# Patient Record
Sex: MALE | Race: ASIAN | NOT HISPANIC OR LATINO | Employment: OTHER | URBAN - METROPOLITAN AREA
[De-identification: names, ages, dates, MRNs, and addresses within clinical notes are randomized per-mention and may not be internally consistent; named-entity substitution may affect disease eponyms.]

---

## 2021-06-16 PROBLEM — K52.9 GASTROENTERITIS: Status: ACTIVE | Noted: 2018-01-24

## 2022-10-31 ENCOUNTER — OFFICE VISIT (OUTPATIENT)
Dept: FAMILY MEDICINE | Facility: CLINIC | Age: 67
End: 2022-10-31
Payer: COMMERCIAL

## 2022-10-31 VITALS
HEIGHT: 60 IN | HEART RATE: 78 BPM | WEIGHT: 104 LBS | BODY MASS INDEX: 20.42 KG/M2 | TEMPERATURE: 98.7 F | RESPIRATION RATE: 20 BRPM | OXYGEN SATURATION: 98 % | DIASTOLIC BLOOD PRESSURE: 81 MMHG | SYSTOLIC BLOOD PRESSURE: 129 MMHG

## 2022-10-31 DIAGNOSIS — D12.6 ADENOMATOUS POLYP OF COLON, UNSPECIFIED PART OF COLON: ICD-10-CM

## 2022-10-31 DIAGNOSIS — R10.13 ABDOMINAL PAIN, EPIGASTRIC: Primary | ICD-10-CM

## 2022-10-31 DIAGNOSIS — I10 ESSENTIAL HYPERTENSION: ICD-10-CM

## 2022-10-31 PROBLEM — J47.9 BRONCHIECTASIS WITHOUT COMPLICATION (H): Status: ACTIVE | Noted: 2019-12-10

## 2022-10-31 LAB
ERYTHROCYTE [DISTWIDTH] IN BLOOD BY AUTOMATED COUNT: 12.7 % (ref 10–15)
HCT VFR BLD AUTO: 46.8 % (ref 40–53)
HGB BLD-MCNC: 16.1 G/DL (ref 13.3–17.7)
MCH RBC QN AUTO: 30.1 PG (ref 26.5–33)
MCHC RBC AUTO-ENTMCNC: 34.4 G/DL (ref 31.5–36.5)
MCV RBC AUTO: 88 FL (ref 78–100)
PLATELET # BLD AUTO: 163 10E3/UL (ref 150–450)
RBC # BLD AUTO: 5.35 10E6/UL (ref 4.4–5.9)
WBC # BLD AUTO: 4.5 10E3/UL (ref 4–11)

## 2022-10-31 PROCEDURE — 99204 OFFICE O/P NEW MOD 45 MIN: CPT | Mod: GC

## 2022-10-31 PROCEDURE — 85027 COMPLETE CBC AUTOMATED: CPT

## 2022-10-31 PROCEDURE — 80061 LIPID PANEL: CPT

## 2022-10-31 PROCEDURE — 36415 COLL VENOUS BLD VENIPUNCTURE: CPT

## 2022-10-31 PROCEDURE — 80053 COMPREHEN METABOLIC PANEL: CPT

## 2022-10-31 RX ORDER — LOSARTAN POTASSIUM 25 MG/1
25 TABLET ORAL
COMMUNITY
Start: 2022-08-03 | End: 2022-10-31

## 2022-10-31 RX ORDER — LOSARTAN POTASSIUM 25 MG/1
25 TABLET ORAL DAILY
Qty: 90 TABLET | Refills: 3 | Status: SHIPPED | OUTPATIENT
Start: 2022-10-31 | End: 2023-12-07

## 2022-10-31 RX ORDER — FAMOTIDINE 20 MG/1
TABLET, FILM COATED ORAL
COMMUNITY
Start: 2022-02-07 | End: 2022-10-31

## 2022-10-31 NOTE — PROGRESS NOTES
Assessment & Plan     Abdominal pain, epigastric  1 year of ongoing episodic epigastric abdominal pain that wakes him from sleep on nights that he eats rice. EGD 11/2021 with evidence of chronic gastritis, negative biopsy for H. pylori. No relief with occasional use of H2 blocker or PPI. No nausea/vomiting/diarrhea. Symptoms sound most consistent with reflux, though differential diagnosis also includes functional abdominal pain/IBS, IBD, gallbladder spasm/cholelithiasis. Low risk for malignancy given negative EGD within the year, no weight loss or other B symptoms. Will obtain basic labs and RUQ ultrasound as well as trial daily use of PPI to assess for improvement.  - Comprehensive metabolic panel  - Lipid Profile  - CBC with platelets  - omeprazole (PRILOSEC) 20 MG DR capsule  Dispense: 90 capsule; Refill: 1  - US ABDOMEN LIMITED    Essential hypertension  Well-controlled at 129/81 today. Refill provided.  - losartan (COZAAR) 25 MG tablet  Dispense: 90 tablet; Refill: 3    Adenomatous polyp of colon, unspecified part of colon  Tubulovillous adenoma seen on screening colonoscopy in 2018 - per current guidelines, would be due for repeat colonoscopy in 2021. Referral sent for colonoscopy with Dr. Sams.  - Adult GI  Referral - Procedure Only    Return in about 3 weeks (around 11/21/2022).    Monika Lou MD PGY-2  [facility]    Precepted with: Dr. Contreras.    Stefan Zarate is a 67 year old, presenting for the following health issues:  Abdominal Pain (Stomach pain over a year)     Abdominal pain/pressure since October last year. Happens whenever he eats rice - no pain initially, then will wake up overnight to gnawing pain in the epigastric region that only gets better after drinking hot water and sitting upright for a couple hours. Doesn't happen on days he doesn't eat rice. Essentially stopped eating rice, now eats bagels with cream cheese instead of rice. The rice  "wasn't especially fatty - typically avoids sticky rice and goes for just steamed rice, but still has the pain. Feels less energy. Has to drink Pepsi to boost energy. Vegetables, meat okay on stomach.     Has been seen for this at [facility] before - thought to be just heartburn. No longer taking pepcid or prilosec. Took these intermittently, not very helpful. Doesn't ever feel burning in chest or throat, no acidic taste. Just the epigastric pain. Never pain in his lower abdomen.    Sent for EGD in [place] 11/2021 - looked normal  Colonoscopy 5/2018 - tubular adenomas, tubulovillous adenoma    Children wanted him to come back to the doctor. Wanting more imaging/testing to determine what is happening.     No nausea/vomiting/diarrhea. No black or bloody stools.     Has had kidney stone in the past.     No significant weight loss in the past year (107 --> 104).         Objective    /81   Pulse 78   Temp 98.7  F (37.1  C) (Oral)   Resp 20   Ht 1.42 m (4' 7.91\")   Wt 47.2 kg (104 lb)   SpO2 98%   BMI 23.39 kg/m    Body mass index is 23.39 kg/m .  GEN: Patient sitting comfortably in no acute distress.  HEEN: Head is atraumatic, normocephalic, eyes anicteric, mucous membranes moist.  CV: Regular rate and rhythm without obvious murmurs.  PULM: Clear to auscultation bilaterally without wheezing or rales.  ABD: Soft, nontender, bowel sounds present. No surgical scars.  NEURO: Alert and oriented x3.  No focal motor abnormalities.  Face symmetric.  PSYCH: Appropriate affect.  SKIN: No rashes, bruising, or other lesions.    "

## 2022-10-31 NOTE — LETTER
November 3, 2022      Elliot Saha  [address] [place] [address]        Dear ,    We are writing to inform you of your test results.    Your test results fall within the expected range(s) or remain unchanged from previous results.  Please continue with current treatment plan.The result of your recent labwork has returned. Your cholesterol was high - I'd recommend starting a cholesterol-lowering medicine for this, which we can talk about more at your upcoming appointment. Your electrolytes, kidney function, and blood counts were normal.       Resulted Orders   Comprehensive metabolic panel   Result Value Ref Range    Sodium 142 136 - 145 mmol/L    Potassium 4.1 3.4 - 5.3 mmol/L    Chloride 110 (H) 98 - 107 mmol/L    Carbon Dioxide (CO2) 16 (L) 22 - 29 mmol/L    Anion Gap 16 (H) 7 - 15 mmol/L    Urea Nitrogen 21.1 8.0 - 23.0 mg/dL    Creatinine 0.76 0.67 - 1.17 mg/dL    Calcium 9.1 8.8 - 10.2 mg/dL    Glucose 96 70 - 99 mg/dL    Alkaline Phosphatase 105 40 - 129 U/L    AST 24 10 - 50 U/L    ALT 22 10 - 50 U/L    Protein Total 6.6 6.4 - 8.3 g/dL    Albumin 4.1 3.5 - 5.2 g/dL    Bilirubin Total 0.4 <=1.2 mg/dL    GFR Estimate >90 >60 mL/min/1.73m2      Comment:      Effective December 21, 2021 eGFRcr in adults is calculated using the 2021 CKD-EPI creatinine equation which includes age and gender (Abbey dyer al., NEJM, DOI: 10.1056/XAAEbw0600537)   Lipid Profile   Result Value Ref Range    Cholesterol 235 (H) <200 mg/dL    Triglycerides 319 (H) <150 mg/dL    Direct Measure HDL 45 >=40 mg/dL    LDL Cholesterol Calculated 126 (H) <=100 mg/dL    Non HDL Cholesterol 190 (H) <130 mg/dL    Narrative    Cholesterol  Desirable:  <200 mg/dL    Triglycerides  Normal:  Less than 150 mg/dL  Borderline High:  150-199 mg/dL  High:  200-499 mg/dL  Very High:  Greater than or equal to 500 mg/dL    Direct Measure HDL  Female:  Greater than or equal to 50 mg/dL   Male:  Greater than or equal to 40 mg/dL    LDL  Cholesterol  Desirable:  <100mg/dL  Above Desirable:  100-129 mg/dL   Borderline High:  130-159 mg/dL   High:  160-189 mg/dL   Very High:  >= 190 mg/dL    Non HDL Cholesterol  Desirable:  130 mg/dL  Above Desirable:  130-159 mg/dL  Borderline High:  160-189 mg/dL  High:  190-219 mg/dL  Very High:  Greater than or equal to 220 mg/dL   CBC with platelets   Result Value Ref Range    WBC Count 4.5 4.0 - 11.0 10e3/uL    RBC Count 5.35 4.40 - 5.90 10e6/uL    Hemoglobin 16.1 13.3 - 17.7 g/dL    Hematocrit 46.8 40.0 - 53.0 %    MCV 88 78 - 100 fL    MCH 30.1 26.5 - 33.0 pg    MCHC 34.4 31.5 - 36.5 g/dL    RDW 12.7 10.0 - 15.0 %    Platelet Count 163 150 - 450 10e3/uL       If you have any questions or concerns, please call the clinic at the number listed above.       Sincerely,      Daylin Contreras MD

## 2022-11-01 LAB
ALBUMIN SERPL BCG-MCNC: 4.1 G/DL (ref 3.5–5.2)
ALP SERPL-CCNC: 105 U/L (ref 40–129)
ALT SERPL W P-5'-P-CCNC: 22 U/L (ref 10–50)
ANION GAP SERPL CALCULATED.3IONS-SCNC: 16 MMOL/L (ref 7–15)
AST SERPL W P-5'-P-CCNC: 24 U/L (ref 10–50)
BILIRUB SERPL-MCNC: 0.4 MG/DL
BUN SERPL-MCNC: 21.1 MG/DL (ref 8–23)
CALCIUM SERPL-MCNC: 9.1 MG/DL (ref 8.8–10.2)
CHLORIDE SERPL-SCNC: 110 MMOL/L (ref 98–107)
CHOLEST SERPL-MCNC: 235 MG/DL
CREAT SERPL-MCNC: 0.76 MG/DL (ref 0.67–1.17)
DEPRECATED HCO3 PLAS-SCNC: 16 MMOL/L (ref 22–29)
GFR SERPL CREATININE-BSD FRML MDRD: >90 ML/MIN/1.73M2
GLUCOSE SERPL-MCNC: 96 MG/DL (ref 70–99)
HDLC SERPL-MCNC: 45 MG/DL
LDLC SERPL CALC-MCNC: 126 MG/DL
NONHDLC SERPL-MCNC: 190 MG/DL
POTASSIUM SERPL-SCNC: 4.1 MMOL/L (ref 3.4–5.3)
PROT SERPL-MCNC: 6.6 G/DL (ref 6.4–8.3)
SODIUM SERPL-SCNC: 142 MMOL/L (ref 136–145)
TRIGL SERPL-MCNC: 319 MG/DL

## 2022-11-01 NOTE — PROGRESS NOTES
Preceptor Attestation:  Patient's case reviewed and discussed with the resident, Monika Lou MD, and I personally evaluated the patient. I agree with written assessment and plan of care.    Supervising Physician:  Daylin Contreras MD   [facility]

## 2022-11-03 ENCOUNTER — ANCILLARY PROCEDURE (OUTPATIENT)
Dept: ULTRASOUND IMAGING | Facility: CLINIC | Age: 67
End: 2022-11-03
Attending: FAMILY MEDICINE
Payer: COMMERCIAL

## 2022-11-03 DIAGNOSIS — R10.13 ABDOMINAL PAIN, EPIGASTRIC: ICD-10-CM

## 2022-11-03 PROCEDURE — 76705 ECHO EXAM OF ABDOMEN: CPT | Mod: TC | Performed by: RADIOLOGY

## 2022-11-03 NOTE — RESULT ENCOUNTER NOTE
Please call with results. If no response, please send letter.    Dear Elliot Saha,    Thank you for visiting our clinic on Oct 31, 2022.      The result of your recent labwork has returned. Your cholesterol was high - I'd recommend starting a cholesterol-lowering medicine for this, which we can talk about more at your upcoming appointment. Your electrolytes, kidney function, and blood counts were normal.    If you have any questions or concerns, please feel free to give us a call at [phone].    Sincerely,    Mitra Lou MD  [facility]

## 2022-11-09 ENCOUNTER — TELEPHONE (OUTPATIENT)
Dept: FAMILY MEDICINE | Facility: CLINIC | Age: 67
End: 2022-11-09

## 2022-11-09 NOTE — TELEPHONE ENCOUNTER
US done 11/3/2022 with IMPRESSION:  1.  Normal limited abdominal ultrasound. Patient given this information. His colonoscopy is scheduled for February 2023. Elliot continues to have discomfort in abdominal, stable but would like to know what is alternative recommendation or screening Dr Lou may have.  Cori RN

## 2022-11-09 NOTE — TELEPHONE ENCOUNTER
[facility] phone call message- patient requesting results:    Test: Lab and Ultrasound    Date of test: 11/3/2022    Additional Comments: Patient called and would like to know results of ultrasound, looked in chart and does not look like results has been annotated, informed patient we are still waiting for the doctor to review it once it's been reviewed someone will call to give results to patient. Patient verbalized understanding.     OK to leave a message on voice mail? Yes    Primary language: English      needed? No    Call taken on November 9, 2022 at 3:09 PM by Tonya Gutierrez

## 2022-11-20 RX ORDER — SUCRALFATE 1 G/1
1 TABLET ORAL AT BEDTIME
COMMUNITY
Start: 2022-03-11 | End: 2024-02-16

## 2022-11-21 ENCOUNTER — OFFICE VISIT (OUTPATIENT)
Dept: FAMILY MEDICINE | Facility: CLINIC | Age: 67
End: 2022-11-21
Payer: COMMERCIAL

## 2022-11-21 VITALS
DIASTOLIC BLOOD PRESSURE: 82 MMHG | WEIGHT: 106 LBS | HEART RATE: 85 BPM | BODY MASS INDEX: 23.84 KG/M2 | TEMPERATURE: 98.2 F | SYSTOLIC BLOOD PRESSURE: 127 MMHG | OXYGEN SATURATION: 97 %

## 2022-11-21 DIAGNOSIS — E78.2 MIXED HYPERLIPIDEMIA: ICD-10-CM

## 2022-11-21 DIAGNOSIS — Z11.59 NEED FOR HEPATITIS C SCREENING TEST: ICD-10-CM

## 2022-11-21 DIAGNOSIS — R10.13 ABDOMINAL PAIN, EPIGASTRIC: Primary | ICD-10-CM

## 2022-11-21 DIAGNOSIS — Z11.4 SCREENING FOR HIV (HUMAN IMMUNODEFICIENCY VIRUS): ICD-10-CM

## 2022-11-21 LAB
HBV SURFACE AB SERPL IA-ACNC: 41.28 M[IU]/ML
HBV SURFACE AB SERPL IA-ACNC: REACTIVE M[IU]/ML
HBV SURFACE AG SERPL QL IA: NONREACTIVE
HCV AB SERPL QL IA: NONREACTIVE
HIV 1+2 AB+HIV1 P24 AG SERPL QL IA: NONREACTIVE

## 2022-11-21 PROCEDURE — 87340 HEPATITIS B SURFACE AG IA: CPT

## 2022-11-21 PROCEDURE — 86706 HEP B SURFACE ANTIBODY: CPT

## 2022-11-21 PROCEDURE — 99214 OFFICE O/P EST MOD 30 MIN: CPT | Mod: GC

## 2022-11-21 PROCEDURE — 86704 HEP B CORE ANTIBODY TOTAL: CPT

## 2022-11-21 PROCEDURE — 86803 HEPATITIS C AB TEST: CPT

## 2022-11-21 PROCEDURE — 36415 COLL VENOUS BLD VENIPUNCTURE: CPT

## 2022-11-21 PROCEDURE — 87350 HEPATITIS BE AG IA: CPT | Mod: 90

## 2022-11-21 PROCEDURE — 87389 HIV-1 AG W/HIV-1&-2 AB AG IA: CPT

## 2022-11-21 PROCEDURE — 99000 SPECIMEN HANDLING OFFICE-LAB: CPT

## 2022-11-21 RX ORDER — SIMETHICONE 80 MG
80 TABLET,CHEWABLE ORAL EVERY 6 HOURS PRN
Qty: 90 TABLET | Refills: 0 | Status: SHIPPED | OUTPATIENT
Start: 2022-11-21 | End: 2023-12-04

## 2022-11-21 RX ORDER — ATORVASTATIN CALCIUM 40 MG/1
40 TABLET, FILM COATED ORAL DAILY
Qty: 90 TABLET | Refills: 1 | Status: SHIPPED | OUTPATIENT
Start: 2022-11-21 | End: 2023-05-30

## 2022-11-21 RX ORDER — FAMOTIDINE 40 MG/1
40 TABLET, FILM COATED ORAL DAILY
Qty: 90 TABLET | Refills: 0 | Status: SHIPPED | OUTPATIENT
Start: 2022-11-21 | End: 2023-01-05

## 2022-11-21 NOTE — PROGRESS NOTES
Assessment & Plan     Abdominal pain, epigastric  Ongoing epigastric pain triggered by rice, in the setting of about a month of consistent PPI usage. Should have tested for H. Pylori before beginning PPI - did not do this. Will obtain H. Pylori testing today - instructed patient to switch to pepcid for 2 weeks before collecting stool sample. Will also obtain hepatitis labs as he was not born in the [place] and was not vaccinated for Hep B. Can also try simethicone. Ordered EGD for ongoing evaluation.  - Hepatitis B surface antigen  - Hepatitis B Surface Antibody  - Helicobacter pylori Antigen Stool  - Hepatitis B core antibody  - Hepatitis Be antigen  - HIV Antigen Antibody Combo  - Hepatitis C antibody  - Hepatitis B surface antigen  - Hepatitis B Surface Antibody  - Helicobacter pylori Antigen Stool  - Hepatitis B core antibody  - Hepatitis Be antigen  - HIV Antigen Antibody Combo  - Hepatitis C antibody  - simethicone (MYLICON) 80 MG chewable tablet  Dispense: 90 tablet; Refill: 0  - famotidine (PEPCID) 40 MG tablet  Dispense: 90 tablet; Refill: 0  - Adult GI  Referral - Procedure Only    Mixed hyperlipidemia  Discussed recent lipid screening with 10 year ASCVD risk of 17.2%. He preferred to start statin, Rx sent.   - atorvastatin (LIPITOR) 40 MG tablet  Dispense: 90 tablet; Refill: 1    Return in about 4 weeks (around 12/19/2022) for medicare wellness visit.    Monika Lou MD PGY-2  [facility]    Precepted with: Dr. Ivy.     Stefan Zarate is a 67 year old presenting for the following health issues:  Abdominal Pain (NOT IMPROVING)    Was seen 10/31 for abdominal pain  Started omeprazole - taking every morning along with losartan  Having pain at night every once in awhile, wakes up from sleep - feels like pressure in epigastric region, drinks hot water and sits up for 1-2 hours and it gets better    Still cannot eat rice, worried it will cause his pain - has not tried  it yet though  Still eating bread/bagel instead    The 10-year ASCVD risk score (Joaquin WILSON, et al., 2019) is: 17.2%    Values used to calculate the score:      Age: 67 years      Sex: Male      Is Non- : No      Diabetic: No      Tobacco smoker: No      Systolic Blood Pressure: 127 mmHg      Is BP treated: Yes      HDL Cholesterol: 45 mg/dL      Total Cholesterol: 235 mg/dL          Objective    /82   Pulse 85   Temp 98.2  F (36.8  C) (Oral)   Wt 48.1 kg (106 lb)   SpO2 97%   PF 97 L/min   BMI 23.84 kg/m    Body mass index is 23.84 kg/m .  GEN: Patient sitting comfortably in no acute distress.  HEEN: Head is atraumatic, normocephalic, eyes anicteric, mucous membranes moist.  CV: Extremities well-perfused. No obvious lower extremity edema.  PULM: Breathing comfortably on room air. Speaking in full sentences.  NEURO: Alert and oriented x3.  No focal motor abnormalities.  Face symmetric.  PSYCH: Appropriate affect.  SKIN: No rashes, bruising, or other lesions visible on exposed skin.

## 2022-11-21 NOTE — PROGRESS NOTES
Faculty Supervision of Residents   I have examined this patient and the medical care has been evaluated and discussed with the resident. See resident note outlining our discussion.      Yeimi Ivy MD

## 2022-11-21 NOTE — PATIENT INSTRUCTIONS
Stop the omeprazole. Start taking the pepcid instead.  Do the poop test in 2 weeks, you can drop off at the .  Try taking the simethicone at night (for gas).  Start the atorvastatin (cholesterol medicine) for your cholesterol.  Switch to whole wheat breads (not white/wonder bread).   We will call to confirm your colonoscopy and endoscopy.  Schedule your medicare wellness visit.

## 2022-11-22 LAB — HBV CORE AB SERPL QL IA: REACTIVE

## 2022-11-23 LAB — HBV E AG SERPL QL IA: NEGATIVE

## 2022-12-01 ENCOUNTER — OFFICE VISIT (OUTPATIENT)
Dept: FAMILY MEDICINE | Facility: CLINIC | Age: 67
End: 2022-12-01
Payer: COMMERCIAL

## 2022-12-01 VITALS
BODY MASS INDEX: 20.42 KG/M2 | DIASTOLIC BLOOD PRESSURE: 87 MMHG | HEART RATE: 68 BPM | SYSTOLIC BLOOD PRESSURE: 125 MMHG | TEMPERATURE: 98.3 F | WEIGHT: 104 LBS | OXYGEN SATURATION: 98 % | HEIGHT: 60 IN

## 2022-12-01 DIAGNOSIS — Z00.00 WELCOME TO MEDICARE PREVENTIVE VISIT: Primary | ICD-10-CM

## 2022-12-01 DIAGNOSIS — Z00.00 WELLNESS EXAMINATION: Primary | ICD-10-CM

## 2022-12-01 PROCEDURE — 99397 PER PM REEVAL EST PAT 65+ YR: CPT | Mod: GC

## 2022-12-01 PROCEDURE — 99207 PR NO CHARGE NURSE ONLY: CPT

## 2022-12-01 NOTE — PROGRESS NOTES
Medicare Wellness Visit  Health Risk Assessment           Health Risk Assessment / Review of Systems     Constitutional: Any fevers or night sweats? No     Eyes:  Vision problems   No     Hearing Do you feel you have hearing loss?  No     Cardiovascular: Any chest pain, fast or irregular heart beat, calf pain with walking?     No           Respiratory:   Any breathing problems or cough?   No     Gastrointestinal: Any stomach or stool problems?   No      Genitourinary: Do you have difficulty controlling urination?   No     Muscles and Joints: Any joint stiffness or soreness?   No     Skin: Any concerning lesions or moles?   No     Nervous System: Any loss of strength or feeling, numbness or tingling, shaking, dizziness, or headache?  No     Mental Health: Any depression, anxiety or problems sleeping?    No     Cognition: Do you have any problems with your memory?  No            Medical Care     What other specialists or organizations are involved in your medical care?  none  Patient Care Team       Relationship Specialty Notifications Start End    Monika Lou MD PCP - General Student in organized health care education/training program  10/27/22     Phone: [phone] Fax: [phone]         [facility] [facility] [address]    Monika Lou MD Assigned PCP   10/19/22     Phone: [phone] Fax: [phone]         [facility] [facility] [address]          Have you been to the ER or overnight in the hospital in the last year?  No          Social History / Home Safety       Marital Status:  Who lives in your household? Wife, 2 adults children    Do you feel threatened or controlled by a partner, ex-partner or anyone in your life? No     Has anyone hurt you physically, for example by pushing, hitting, slapping or kicking you   or forcing you to have sex? No          Does your home have any of the following safety concerns; loose rugs in the hallway,  bathrooms with no grab bars by  the tub or toilet, stairs with no handrails or poorly lit areas?  No     Do you need help with dressing yourself, bathing, eating or getting around your home?  No     Do you need help with the phone, transportation, shopping, preparing meals, housework, laundry, medications or managing money?No       Risk Behaviors and Healthy Habits     History   Smoking Status     Never   Smokeless Tobacco     Not on file     How many servings of fruits and vegetables do you eat a day? 3 serving on average a day. Reviewed daily intake recommendation and encouraged when able to increase intake.    Exercise: walking on weekend x 2 days, 30- 60 mins, reviewed and encouraged to increase walking when able.     Do you frequently drive without a seatbelt? No     Do you use tobacco?  No     Do you use any other drugs? No         Do you use alcohol?No      Frailty Assessment            Have you lost 10 or more pounds unintentionally in the previous year? No     How difficult is walking from one room to the other on the same level?not       Is it difficult to lift or carry something as heavy as 10 pounds?not      Compared with most (men/women) your age, would you say  that you are more active, less active, or about the same?  same        FALL RISK ASSESSMENT 12/1/2022   Fallen 2 or more times in the past year? No   Any fall with injury in the past year? No   Timed Up and Go Test/Seconds (13.5 is a fall risk; contact physician) 11         Advance Directives:   Discussed with patient and family as appropriate. Has patient  completed advance directives and/or a living will? No, blank copy given to patient to take home, review with his children and complete it at their convenience.      Wendy Tirado RN

## 2022-12-01 NOTE — PROGRESS NOTES
SUBJECTIVE:                                                                                Elliot Saha is a 67 year old male who presents for a Welcome to Medicare Visit.    All Histories reviewed and updated in EPIC as appropriate.    Social History     Tobacco Use     Smoking status: Never     Smokeless tobacco: Not on file   Substance Use Topics     Alcohol use: No       Diet: regular, low salt/low fat  Physical Activity: active without specific exercise program    The patient does not drink >3 drinks per day nor >7 drinks per week.      Today's PHQ-2 Score:      Do you have a Health Care Directive? No, advance care planning information given to patient to review.  Patient plans to discuss their wishes with loved ones or provider.      Current providers sharing in care for this patient include:   Patient Care Team:  Monika Lou MD as PCP - General (Student in organized health care education/training program)  Monika Lou MD as Assigned PCP      Hearing impairment: No    Ability to successfully perform activities of daily living: Yes, no assistance needed    Fall risk:   Fallen 2 or more times in the past year?: No  Any fall with injury in the past year?: No    Timed Up and Go Test (>13.5 is fall risk; contact physician) : 11    Home safety:  none identified    The following health maintenance items are reviewed in Epic and correct as of today:  Health Maintenance   Topic Date Due     ADVANCE CARE PLANNING  Never done     COLORECTAL CANCER SCREENING  Never done     ZOSTER IMMUNIZATION (1 of 2) Never done     Pneumococcal Vaccine: 65+ Years (2 - PCV) 02/16/2019     COVID-19 Vaccine (4 - Booster for Moderna series) 02/14/2022     INFLUENZA VACCINE (1) 09/01/2022     DTAP/TDAP/TD IMMUNIZATION (2 - Td or Tdap) 05/20/2023     MEDICARE ANNUAL WELLNESS VISIT  12/01/2023     FALL RISK ASSESSMENT  12/01/2023     LIPID  10/31/2027     HEPATITIS C SCREENING  Completed     HIV SCREENING  Completed     PHQ-2  "(once per calendar year)  Completed     AORTIC ANEURYSM SCREENING (SYSTEM ASSIGNED)  Completed     IPV IMMUNIZATION  Aged Out     MENINGITIS IMMUNIZATION  Aged Out     SCREENING FOR PREVENTION and EARLY DETECTION     ECG (if done)not performed    Screening Lipid Level (covered every 5 years ): Testing not indicated - recently tested November 2022 and started statin  HIV screening (at risk ):  Date done 11/2022  Result(s) neg  Colon CA Screening (>50-75 ) (FITT annually or colonoscopy every 10years):  Recommended and patient accepted testing - scheduled Feb    CV Risk based on Pooled Cohort Risk:The 10-year ASCVD risk score (Joaquin WILSON, et al., 2019) is: 16.8%    Values used to calculate the score:      Age: 67 years      Sex: Male      Is Non- : No      Diabetic: No      Tobacco smoker: No      Systolic Blood Pressure: 125 mmHg      Is BP treated: Yes      HDL Cholesterol: 45 mg/dL      Total Cholesterol: 235 mg/dL     Advanced Directives: Discussed and patient desires to be full code.     Immunization History   Administered Date(s) Administered     COVID-19 Vaccine 18+ (Moderna) 03/01/2021, 03/29/2021, 12/20/2021     Influenza Vaccine >6 months (Alfuria,Fluzone) 02/16/2018, 11/15/2021     Pneumococcal 23 valent 02/16/2018     Tdap (Adacel,Boostrix) 05/20/2013     Reviewed Immunization Record Today  Pneumoccocal Vaccine: Declined  Declined COVID and influenza vaccines as well    OBJECTIVE:                                                                                    Vitals: /87   Pulse 68   Temp 98.3  F (36.8  C) (Oral)   Ht 1.4 m (4' 7.12\")   Wt 47.2 kg (104 lb)   SpO2 98%   BMI 24.07 kg/m    BMI= Body mass index is 24.07 kg/m .    EXAM:   GEN: Patient sitting comfortably in no acute distress.  HEEN: Head is atraumatic, normocephalic, eyes anicteric, mucous membranes moist.  CV: Regular rate and rhythm without obvious murmurs.  PULM: Clear to auscultation bilaterally without " "wheezing or rales.  ABD: Soft, nontender, bowel sounds present.  NEURO: Alert and oriented x3.  No focal motor abnormalities.  Face symmetric.  PSYCH: Appropriate affect.  SKIN: No rashes, bruising, or other lesions.    ASSESSMENT/PLAN:                                                                      ICD-10-CM    1. Welcome to Medicare preventive visit  Z00.00         End of Life Planning:   Patient currently has an advanced directive: No.  I have verified the patient's ablity to prepare an advanced directive/make health care decisions.  Literature was provided to assist patient in preparing an advanced directive.    COUNSELING:  Reviewed preventive health counseling, as reflected in patient instructions       Regular exercise       Healthy diet/nutrition       Dental care       Fall risk prevention       Colon cancer screening  Estimated body mass index is 24.07 kg/m  as calculated from the following:    Height as of this encounter: 1.4 m (4' 7.12\").    Weight as of this encounter: 47.2 kg (104 lb).     reports that he has never smoked. He does not have any smokeless tobacco history on file.      Appropriate preventive services were discussed with this patient, including applicable screening as appropriate for cardiovascular disease, diabetes, osteopenia/osteoporosis, and glaucoma.  As appropriate for age/gender, discussed screening for colorectal cancer, prostate cancer, breast cancer, and cervical cancer. Checklist reviewing preventive services available has been given to the patient.    Reviewed patients plan of care and provided an AVS. The Basic Care Plan (routine screening as documented in Health Maintenance) for Elliot meets the Care Plan requirement. This Care Plan has been established and reviewed with the Patient.    Monika Lou MD  [facility] "

## 2022-12-01 NOTE — RESULT ENCOUNTER NOTE
Results discussed directly with patient while patient was present. Any further details documented in the note. Evidence of prior Hep B infection, no chronic infection. Not likely to explain his abdominal pain. No follow-up needed.  Monika Lou MD

## 2022-12-01 NOTE — PROGRESS NOTES
Preceptor Attestation:   Patient seen, evaluated and discussed with the resident. I have verified the content of the note, which accurately reflects my assessment of the patient and the plan of care.    Supervising Physician:Cassy Ferrera MD    [facility]

## 2022-12-01 NOTE — PATIENT INSTRUCTIONS
PERSONAL PREVENTIVE SERVICES PLAN - SERVICES     Review these tests with your medical staff then decide which ones you want and take this page home for your reference      SCREENING TESTS     Description   Year of Last Screening   Recommended Today?   Heart disease screening blood tests    Cholesterol level Reducing cholesterol can reduce your risk of heart attacks by 25%.  Screening is recommended yearly if you are at risk of heart disease otherwise every 4-5 years 10/31/22 No; is up to date.   Diabetes screening tests    Hemoglobin A1c blood test   Finding and treating diabetes early can reduce complications.  Screening recommended/covered yearly if you have high blood pressure, high cholesterol, obesity (BMI >30), or a history of high blood glucose tests; or 2 of the following: family history of diabetes, overweight (BMI >25 but <30), age 65 years or older, and a history of diabetes of pregnancy or gave birth to baby weighing more than 9 lbs. 10/31/22  CMP,glucose- 96 No; is up to date.   Hepatitis B screening Finding hepatitis B early can reduce complications.  Screening is recommended for persons with selected risk factors. 11/21/22  neg No; is up to date.   Hepatitis C screening Finding hepatitis C early can reduce complications.  Screening is recommended for all persons born from 1945 through 1965 and for those with selected other risk factors.  11/21/22  neg No; is up to date.   HIV screening Finding HIV early can reduce complications.  Screening is recommended for persons with risk factors for HIV infection. 11/21/22  neg No; is up to date.   Glaucoma screening Early detection of glaucoma can prevent blindness.   Please talk to your eye doctor about this.       SCREENING TESTS     Description   Year of Last Screening   Recommended Today?   Colorectal cancer screening    Fecal occult blood test     Screening colonoscopy Screening for colon cancer has been shown to reduce death from colon cancer by 25-30%.  Screening recommended to start at 50 years and continuing until age 75 years.    Yes; Recommended    Breast Cancer Screening (women)    Mammogram Mammogram screening for breast cancer has been shown to reduce the risk of dying from breast cancer and prolong life. Screening is recommended every 1-2 years for women aged 50 to 74 years.   No: is not indicated today.   Cervical Cancer screening (women)    Pap Cervical pap smears can reduce cervical cancer. Screening is recommended annually if high risk (history of abnormal pap smears) otherwise every 2-3 years, stop screening at 65 years of age if history of normal paps.  No: is not indicated today.   Screening for Osteoporosis:  Bone mass measurements (women)    Dexa Scan Screening and treating Osteoporosis can reduce the risk of hip and spine fractures. Screening is recommended in women 65 years or older and in women and men at risk of osteoporosis.  No: is not indicated today.   Screening for Lung Cancer     Low-dose CT scanning Screening can reduce mortality in persons aged 55-80 who have smoked at least 30 pack-years and who are either still smoking or have quit in the past 15 years.  No: is not indicated today.   Abdominal Aortic Aneurysm (AAA) screening    Ultrasound (US)   An aneurysm treated before rupture is very safe -a ruptured aneurysm can be fatal.  Screening  by US for AAA is limited to patients who meet one of the following criteria:    Men who are 65-75 years old and have smoked more than 100 cigarettes in their lifetime    Anyone with a family history of abdominal aortic aneurysm  No: is not indicated today.     Here are your recommended immunizations.  Take this home for your reference.                                                    IMMUNIZATIONS Description Recommend today?     Influenza (Flu shot) Prevents flu; should get every year Yes; Recommended    PCV 13 Pneumonia vaccination; you get it once Yes; Recommended    PPSV 23 Second pneumonia  vaccination; usually get it 1 year after PCV 13 No: is not indicated today.   Zoster (Shingles) Prevents shingles; you get it once  (Check with Part D insurance for coverage, must receive at a pharmacy, not clinic) Yes; Recommended   Tetanus Prevents tetanus; once every 10 years No; is up to date.     Hepatitis B  If you have any of the following risk factors you should be immunized for hepatitis B: severe kidney disease, people who live in the same house as a carrier of Hepatitis B virus, people who live in  institutions (e.g. nursing homes or group homes), homosexual men, patients with hemophilia who received Factor VIII or IX concentrates, abusers of illicit injectable drugs No: is not indicated today.      PATIENT INSTRUCTIONS    Yearly exam:     See your health care provider every year in order to review changes in your health, review medicines that you take, and discuss preventive care needs such as immunizations and cancer screening.    Get a flu shot each year.     Advance Directives:    If you have not done so, you are encouraged to complete advance directives and/or a living will.   More information about advance directives can be found at: http://www.[facility]med.org/advocacy/Key-Issues/Advance-Directives    Nutrition:     Eat at least 5 servings of fruits and vegetables each day.     Eat whole-grain bread, whole-wheat pasta and brown rice instead of white grains and rice.     Talk to your doctor about Calcium and Vitamin D.     Lifestyle:    Exercise for at least 150 minutes a week (30 minutes a day, 5 days a week). This will help you control your weight and prevent disease.     Limit alcohol to one drink per day.     If you smoke, try to quit - your doctor will be happy to help.     Wear sunscreen to prevent skin cancer.     See your dentist every six months for an exam and cleaning.     See your eye doctor every 1 to 2 years to screen for conditions such as glaucoma, macular degeneration and  cataracts.                            Personalized Prevention Plan  You are due for the preventive services outlined below.  Your care team is available to assist you in scheduling these services.  If you have already completed any of these items, please share that information with your care team to update in your medical record.  Health Maintenance Due   Topic Date Due     Discuss Advance Care Planning  Never done     Colorectal Cancer Screening  Never done     Zoster (Shingles) Vaccine (1 of 2) Never done     Pneumococcal Vaccine (2 - PCV) 02/16/2019     COVID-19 Vaccine (4 - Booster for Moderna series) 02/14/2022     Flu Vaccine (1) 09/01/2022

## 2022-12-08 ENCOUNTER — NURSE TRIAGE (OUTPATIENT)
Dept: FAMILY MEDICINE | Facility: CLINIC | Age: 67
End: 2022-12-08

## 2022-12-08 DIAGNOSIS — U07.1 INFECTION DUE TO 2019 NOVEL CORONAVIRUS: ICD-10-CM

## 2022-12-08 DIAGNOSIS — R05.1 ACUTE COUGH: ICD-10-CM

## 2022-12-08 NOTE — TELEPHONE ENCOUNTER
Writer called @ 0942am, left message for patient to call clinic. Cori RN        Additional Information    Negative: SEVERE difficulty breathing (e.g., struggling for each breath, speaks in single words)    Negative: Difficult to awaken or acting confused (e.g., disoriented, slurred speech)    Negative: Bluish (or gray) lips or face now    Negative: Shock suspected (e.g., cold/pale/clammy skin, too weak to stand, low BP, rapid pulse)    Negative: Sounds like a life-threatening emergency to the triager    Negative: [1] Diagnosed or suspected COVID-19 AND [2] symptoms lasting 3 or more weeks    Negative: [1] COVID-19 exposure AND [2] no symptoms    Negative: COVID-19 vaccine reaction suspected (e.g., fever, headache, muscle aches) occurring 1 to 3 days after getting vaccine    Negative: COVID-19 vaccine, questions about    Negative: [1] Lives with someone known to have influenza (flu test positive) AND [2] flu-like symptoms (e.g., cough, runny nose, sore throat, SOB; with or without fever)    Negative: [1] Adult with possible COVID-19 symptoms AND [2] triager concerned about severity of symptoms or other causes    Negative: COVID-19 and breastfeeding, questions about    Negative: SEVERE or constant chest pain or pressure  (Exception: Mild central chest pain, present only when coughing.)    Negative: MODERATE difficulty breathing (e.g., speaks in phrases, SOB even at rest, pulse 100-120)    Negative: Headache and stiff neck (can't touch chin to chest)    Negative: Oxygen level (e.g., pulse oximetry) 90 percent or lower    Negative: Chest pain or pressure    Negative: Patient sounds very sick or weak to the triager    Negative: MILD difficulty breathing (e.g., minimal/no SOB at rest, SOB with walking, pulse <100)    Negative: Fever > 103 F (39.4 C)    Negative: [1] Fever > 101 F (38.3 C) AND [2] over 60 years of age    Negative: [1] Fever > 100.0 F (37.8 C) AND [2] bedridden (e.g., nursing home patient, CVA, chronic  illness, recovering from surgery)    Negative: HIGH RISK for severe COVID complications (e.g., weak immune system, age > 64 years, obesity with BMI > 25, pregnant, chronic lung disease or other chronic medical condition) (Exception: Already seen by PCP and no new or worsening symptoms.)    Negative: [1] HIGH RISK patient AND [2] influenza is widespread in the community AND [3] ONE OR MORE respiratory symptoms: cough, sore throat, runny or stuffy nose    Negative: [1] HIGH RISK patient AND [2] influenza exposure within the last 7 days AND [3] ONE OR MORE respiratory symptoms: cough, sore throat, runny or stuffy nose    Negative: Oxygen level (e.g., pulse oximetry) 91 to 94 percent    Negative: [1] COVID-19 infection suspected by caller or triager AND [2] mild symptoms (cough, fever, or others) AND [3] negative COVID-19 rapid test    Negative: Fever present > 3 days (72 hours)    Negative: [1] Fever returns after gone for over 24 hours AND [2] symptoms worse or not improved    Negative: [1] Continuous (nonstop) coughing interferes with work or school AND [2] no improvement using cough treatment per Care Advice    Negative: Cough present > 3 weeks    Negative: [1] COVID-19 diagnosed by positive lab test (e.g., PCR, rapid self-test kit) AND [2] NO symptoms (e.g., cough, fever, others)    Negative: [1] COVID-19 diagnosed by positive lab test (e.g., PCR, rapid self-test kit) AND [2] mild symptoms (e.g., cough, fever, others) AND [3] no complications or SOB    Negative: [1] COVID-19 diagnosed by doctor (or NP/PA) AND [2] mild symptoms (e.g., cough, fever, others) AND [3] no complications or SOB    Negative: [1] COVID-19 diagnosed AND [2] has mild nausea, vomiting or diarrhea    Negative: [1] COVID-19 infection suspected by caller or triager AND [2] mild symptoms (cough, fever, or others) AND [3] has not gotten tested yet    Negative: COVID-19 Home Isolation, questions about    Negative: COVID-19 Testing, questions about     Negative: COVID-19 Prevention and Healthy Living, questions about    Negative: COVID-19 Disease, questions about    Answer Assessment - Initial Assessment Questions  1. COVID-19 DIAGNOSIS: performed at home covid test, positive 12/7/2022    2. COVID-19 EXPOSURE: no    3. ONSET:  chills, cough, muscle/ body aches, headaches and sore throat, started 12/5/2022, has been taking Tylenol 650 mg, 1-2 tablets every 6-8 hours.     4. WORST SYMPTOM: coughing, dry cough     5. COUGH: moderate to severe    6. FEVER: denies experiencing fever at this time.    7. RESPIRATORY STATUS: none    8. BETTER-SAME-WORSE: worse in coughing and body aches    9. HIGH RISK DISEASE: none    10. VACCINE: received Moderna primary doses plus one Moderna monovalent booster 12/2021    11. BOOSTER: Moderna monovalent    12. PREGNANCY: n/a    13. OTHER SYMPTOMS: fatigue    14. O2 SATURATION MONITOR:  no    Protocols used: CORONAVIRUS (COVID-19) DIAGNOSED OR [id number] 1.18.2022

## 2022-12-08 NOTE — TELEPHONE ENCOUNTER
Writer calling to request Paxlovid for positive covid treatment.    RN COVID TREATMENT VISIT  12/08/22    Elliot Saha  67 year old  Current weight? 104 lbs    Has the patient been seen by a primary care provider at an [facility] or [facility] within the past two years? Yes.   Have you been in close proximity to/do you have a known exposure to a person with a confirmed case of influenza? No.     Date of positive COVID test (PCR or at home)?  12/7/2022    Current COVID symptoms: fever or chills, cough, fatigue, muscle or body aches, headache and sore throat    Date COVID symptoms began: 12/5/2022    Do you have any of the following conditions that place you at risk of being very sick from COVID-19? 65 years or older and member of a [facility] community    Is patient eligible to continue? Yes, established patient, 12 years or older weighing at least 88.2 lbs, who has COVID symptoms that started in the past 5 days and is at risk for being very sick from COVID-19.       Have you received monoclonal antibodies or oral antiviral medications since testing positive to COVID-19? No    Are you currently hospitalized for COVID-19? No    Do you have a history of hepatitis? No    Are you currently pregnant or nursing? No    Do you have a clinically significant hypersensitivity to nirmatrelvir, ritonavir, or molnupiravir? No    Do you have any history of severe renal impairment (eGFR < 30mL/min)? No    Do you have any history of hepatic impairment or abnormalities (e.g. hepatic panel, ALT, AST, ALK Phos, bilirubin)? No    Have you had a coronary stent placed in the previous 6 months? No    Is patient eligible to continue?   Yes, patient meets all eligibility requirements for the RN COVID treatment (as denoted by all no responses above).     Current Outpatient Medications   Medication Sig Dispense Refill     atorvastatin (LIPITOR) 40 MG tablet Take 1 tablet (40 mg) by mouth daily 90 tablet 1     famotidine  (PEPCID) 40 MG tablet Take 1 tablet (40 mg) by mouth daily 90 tablet 0     losartan (COZAAR) 25 MG tablet Take 1 tablet (25 mg) by mouth daily 90 tablet 3     omeprazole (PRILOSEC) 20 MG DR capsule Take 1 capsule (20 mg) by mouth daily 90 capsule 1     simethicone (MYLICON) 80 MG chewable tablet Take 1 tablet (80 mg) by mouth every 6 hours as needed for flatulence or cramping 90 tablet 0     sucralfate (CARAFATE) 1 GM tablet Take 1 g by mouth At Bedtime         Medications from List 1 of the standing order (on medications that exclude the use of Paxlovid) that patient is taking: NONE. Is patient taking [place]'s Wort? No  Is patient taking Braeden's Wort or any meds from List 1? No.   Medications from List 2 of the standing order (on meds that provider needs to adjust) that patient is taking: NONE. Is patient on any of the meds from List 2? No.   Medications from List 3 of standing order (on meds that a RN needs to adjust) that patient is taking: atorvastatin (Lipitor): Instructed patient to stop atorvastatin while taking Paxlovid and restart atorvastatin 1 day after the completion of Paxlovid.  Is patient on any meds from List 3? Yes. Patient is on meds from list 3. No meds require a provider visit and at least one med required RN to adjust.   In order of efficacy, Paxlovid has an approximate 90% reduction in hospitalization. Molnupiravir has an approximate 30% reduction in hospitalization.   Which treatment option does the patient prefer?   Paxlovid.   GFR Estimate   Date Value Ref Range Status   10/31/2022 >90 >60 mL/min/1.73m2 Final     Comment:     Effective December 21, 2021 eGFRcr in adults is calculated using the 2021 CKD-EPI creatinine equation which includes age and gender (Abbey dyer al., [place], DOI: 10.1056/SVMHnx4796802)   01/26/2018 >60 >60 mL/min/1.73m2 Final       Is eGFR reduced in the last year? No/No Result on record. Paxlovid Rx sent to [facility]   [facility]  change to home medications: atorvastatin (Lipitor): Instructed patient to stop atorvastatin while taking Paxlovid and restart atorvastatin 1 day after the completion of Paxlovid.     All medication adjustments (holds, etc) were discussed with the patient and patient was asked to repeat back (teachback) their med adjustment.  Did patient understand med adjustment? Yes, patient repeated back and understood correctly. Has taken Atorvastatin 40 mg today already, will start Paxlovid tomorrow and hold Atorvastatin.      Reviewed the following instructions with the patient:    Paxlovid (nimatrelvir and ritonavir)    How it works  Two medicines (nirmatrelvir and ritonavir) are taken together. They stop the virus from growing. Less amount of virus is easier for your body to fight.    How to take    Medicine comes in a daily container with both medicine tablets. Take by mouth twice daily (once in the morning, once at night) for 5 days.    The number of tablets to take varies by patient.    Don't chew or break capsules. Swallow whole.    When to take  Take as soon as possible after positive COVID-19 test result, and within 5 days of your first symptoms.    Possible side effects  Can cause altered sense of taste, diarrhea (loose, watery stools), high blood pressure, muscle aches.    Wendy Tirado RN

## 2022-12-08 NOTE — TELEPHONE ENCOUNTER
COVID Positive/Requesting COVID treatment     Patient is positive for COVID and requesting treatment options.     Date of positive COVID test (PCR or at home)? 12/07/2022     Current COVID symptoms: fever or chills, cough, muscle or body aches, headache and sore throat  Date COVID symptoms began: 12/05/2022     Message should be routed to clinic RN pool. Best phone number to use for call back:      [phone]

## 2022-12-13 ENCOUNTER — VIRTUAL VISIT (OUTPATIENT)
Dept: FAMILY MEDICINE | Facility: CLINIC | Age: 67
End: 2022-12-13
Payer: COMMERCIAL

## 2022-12-13 DIAGNOSIS — R23.4 CRACKED SKIN: ICD-10-CM

## 2022-12-13 DIAGNOSIS — L85.3 DRY SKIN: Primary | ICD-10-CM

## 2022-12-13 PROCEDURE — 99213 OFFICE O/P EST LOW 20 MIN: CPT | Mod: 95 | Performed by: FAMILY MEDICINE

## 2022-12-13 NOTE — PROGRESS NOTES
"Family Medicine Video Visit Note  Elliot is being evaluated via a billable video visit.           Video Visit Consent     Patient was verbally read the following and verbal consent was obtained.  \"Video visits are billed at different rates depending on your insurance coverage. During this emergency period, for some insurers they may be billed the same as an in-person visit.  Please reach out to your insurance provider with any questions.  If during the course of the call the physician/provider feels a video visit is not appropriate, you will not be charged for this service.\"     Patient would like the video invitation sent by: Text to cell phone: [phone]      Chief Complaint   Patient presents with     Eye Injury     Right side scratch, x few days, itchy, swollen and possible infection. Doesn't remember injuring it            HPI       Telephone Start Time: 4:51 PM    Elliot presents to clinic today for the following health issues:    Complains of itching and swelling of her right eye since Saturday. There seems to be a scratch of the outside corner of her eye lid but not the eyeball itself. Denies changes in eyesight. Keeps washing her closed lid with soapy water. Denies any redness. Increased watering.    Might also have a discreet bump on her eyelid.      Current Outpatient Medications   Medication Sig Dispense Refill     atorvastatin (LIPITOR) 40 MG tablet Take 1 tablet (40 mg) by mouth daily 90 tablet 1     famotidine (PEPCID) 40 MG tablet Take 1 tablet (40 mg) by mouth daily 90 tablet 0     losartan (COZAAR) 25 MG tablet Take 1 tablet (25 mg) by mouth daily 90 tablet 3     nirmatrelvir and ritonavir (PAXLOVID) therapy pack Take 3 tablets by mouth 2 times daily for 5 days (Take 2 Nirmatrelvir tablets and 1 Ritonavir tablet twice daily for 5 days) 30 each 0     simethicone (MYLICON) 80 MG chewable tablet Take 1 tablet (80 mg) by mouth every 6 hours as needed for flatulence or cramping 90 tablet 0     omeprazole " "(PRILOSEC) 20 MG DR capsule Take 1 capsule (20 mg) by mouth daily (Patient not taking: Reported on 12/13/2022) 90 capsule 1     sucralfate (CARAFATE) 1 GM tablet Take 1 g by mouth At Bedtime (Patient not taking: Reported on 12/13/2022)       No Known Allergies           Review of Systems:     CONSTITUTIONAL: NEGATIVE for fever, chills, change in weight         Physical Exam:     There were no vitals taken for this visit.  Estimated body mass index is 24.07 kg/m  as calculated from the following:    Height as of 12/1/22: 1.4 m (4' 7.12\").    Weight as of 12/1/22: 47.2 kg (104 lb).    GENERAL: Healthy, alert and no distress  RESP: No audible wheeze, cough, or visible cyanosis.  No visible retractions or increased work of breathing.    PSYCH: Mentation appears normal, affect normal/bright, judgement and insight intact, normal speech and appearance well-groomed.        Assessment and Plan     1. Dry skin  Unfortunately, was unable to get the video to work. He initially was talking about a crack in the skin and we talked about using Vaseline to help repair it and to stop using soaps to scrub the area. Then, he also said maybe there is a bump there too. Spent some time sussing this out and still couldn't get there. Encouraged 3-4/daily warm compresses. Discussed red flag symptoms to seek care. Follow up if not getting better. Pt v/u.    2. Cracked skin    After Visit Information:  Patient declined AVS       No follow-ups on file.      Video-Visit Details    Type of service:  Video Visit    Video End Time (time video stopped): 5:04 PM    Originating Location (pt. Location): Home    Distant Location (provider location):  [facility]     Platform used for Video Visit: Unable to complete video visit    26 minutes spent on the date of the encounter doing chart review, history and exam, documentation, and further activities as noted above.      Yaron Sams III, MD, FAAFP  [facility] " Faculty  12/14/22 8:59 AM

## 2022-12-16 ENCOUNTER — OFFICE VISIT (OUTPATIENT)
Dept: FAMILY MEDICINE | Facility: CLINIC | Age: 67
End: 2022-12-16
Payer: COMMERCIAL

## 2022-12-16 VITALS
WEIGHT: 104 LBS | DIASTOLIC BLOOD PRESSURE: 89 MMHG | OXYGEN SATURATION: 97 % | BODY MASS INDEX: 24.07 KG/M2 | TEMPERATURE: 97.4 F | HEART RATE: 73 BPM | RESPIRATION RATE: 16 BRPM | SYSTOLIC BLOOD PRESSURE: 132 MMHG

## 2022-12-16 DIAGNOSIS — B02.9 HERPES ZOSTER WITHOUT COMPLICATION: Primary | ICD-10-CM

## 2022-12-16 PROCEDURE — 99213 OFFICE O/P EST LOW 20 MIN: CPT | Performed by: PHYSICIAN ASSISTANT

## 2022-12-16 RX ORDER — VALACYCLOVIR HYDROCHLORIDE 1 G/1
1000 TABLET, FILM COATED ORAL 3 TIMES DAILY
Qty: 30 TABLET | Refills: 0 | Status: SHIPPED | OUTPATIENT
Start: 2022-12-16 | End: 2022-12-26

## 2022-12-16 RX ORDER — GABAPENTIN 100 MG/1
CAPSULE ORAL
Qty: 153 CAPSULE | Refills: 0 | Status: SHIPPED | OUTPATIENT
Start: 2022-12-16 | End: 2023-01-05

## 2022-12-16 NOTE — PROGRESS NOTES
Assessment & Plan     Herpes zoster without complication  V1 into distribution, with eye involvement.  Was given Valtrex as ordered.  We will start Neurontin per request of patient for pain control.  Discussed ramping up dose.  If he becomes too drowsy will decrease by 1 tablet.  He will follow-up with primary care in approximately 2 weeks to further assess need for any ongoing pain management.  May take Tylenol for pain.  Referral to ophthalmology and was able to obtain a same-day appointment with [facility] here in [place].    - valACYclovir (VALTREX) 1000 mg tablet  Dispense: 30 tablet; Refill: 0  - gabapentin (NEURONTIN) 100 MG capsule  Dispense: 153 capsule; Refill: 0  - Adult Eye  Referral     [id number]}    Return for Follow up with primary care provider in 1-2 weeks.    Joana Aleman PA-C  [facility] is a 67 year old male who presents to clinic today for the following health issues:  Chief Complaint   Patient presents with     Eye Problem     Right eye redness and swelling     HPI  Patient is a pleasant 67-year-old male with a history of hypertension and GERD, who presents to urgent care with complaint concerns regarding right eye pain redness and swelling.  He denies any vision change but has had photophobia.  Symptoms have been present for approximately 5 days.  He also has some paresthesias to the forehead and cheek as well as temple area and has noted a rash which started just lateral of his right thigh and now has noted some lesions on the lower eyelid the right cheek and the right forehead.  He denies any fevers.  He is on day 10 of COVID-19 illness and was treated with Paxil bid.    Review of Systems  Constitutional, HEENT, cardiovascular, pulmonary, gi and gu systems are negative, except as otherwise noted.      Patient Active Problem List   Diagnosis     Gastroenteritis     Hypokalemia     Intractable vomiting with nausea,  unspecified vomiting type     Lower abdominal pain     Diarrhea, unspecified type     Adenomatous polyp of colon     Bronchiectasis without complication (H)     Essential hypertension     Ureteral stone     Abdominal pain, epigastric     Screening for HIV (human immunodeficiency virus)       Objective    /89 (BP Location: Right arm, Patient Position: Sitting, Cuff Size: Adult Regular)   Pulse 73   Temp 97.4  F (36.3  C) (Oral)   Resp 16   Wt 47.2 kg (104 lb)   SpO2 97%   BMI 24.07 kg/m    Physical Exam   Pt is in no acute distress and appears well  Exam of the face reveals a crusting lesion just lateral to the R eye, similar erythematous crusting papule mid lower lid margin, single papule R upper lip and R cheek and 2 papules L forehead, tip of R nose.    PERRL  Conjunctiva injected with clear tearing.    Pt had improvement of pain with topical anesthetic.    flourasine dye placed and there is no increased uptake.

## 2022-12-16 NOTE — PATIENT INSTRUCTIONS
You have an appointment at [facility] at 1:35 pm   [email or web address]  ([address])      Arrive 10 minutes early at 1:25 for paperwork   Bring your insurance card

## 2023-01-05 DIAGNOSIS — R10.13 ABDOMINAL PAIN, EPIGASTRIC: ICD-10-CM

## 2023-01-06 RX ORDER — FAMOTIDINE 40 MG/1
40 TABLET, FILM COATED ORAL DAILY
Qty: 90 TABLET | Refills: 1 | Status: SHIPPED | OUTPATIENT
Start: 2023-01-06 | End: 2023-09-08

## 2023-02-10 DIAGNOSIS — B02.9 HERPES ZOSTER WITHOUT COMPLICATION: ICD-10-CM

## 2023-02-10 RX ORDER — GABAPENTIN 100 MG/1
CAPSULE ORAL
Qty: 153 CAPSULE | Refills: 0 | OUTPATIENT
Start: 2023-02-10 | End: 2023-03-02

## 2023-02-10 NOTE — TELEPHONE ENCOUNTER
Please call pt, if she is in need of more gabapentin I would recommend she follow up with primary care for recheck.  If she she is doing well it could be tapered off and I am happy to order a tapering dose if she is out of medication.  AT the time of her initial appt she was told to follow-up with her pcp for ongoing needs.

## 2023-02-10 NOTE — TELEPHONE ENCOUNTER
Routing refill request to provider for review/approval because:  Drug not on the [facility] refill protocol      CECILIA Kenyon  [facility]

## 2023-02-13 NOTE — TELEPHONE ENCOUNTER
Called patient and message as given. Patient states he has been off the medication x 1 month and is doing okay. No need for refill and will follow up with PCP if anything changes.    Carrie Armenta on 2/13/2023 at 11:32 AM

## 2023-03-15 ENCOUNTER — APPOINTMENT (OUTPATIENT)
Dept: CT IMAGING | Facility: HOSPITAL | Age: 68
End: 2023-03-15
Attending: EMERGENCY MEDICINE
Payer: COMMERCIAL

## 2023-03-15 ENCOUNTER — HOSPITAL ENCOUNTER (EMERGENCY)
Facility: HOSPITAL | Age: 68
Discharge: HOME | End: 2023-03-15
Attending: EMERGENCY MEDICINE | Admitting: EMERGENCY MEDICINE
Payer: COMMERCIAL

## 2023-03-15 VITALS
OXYGEN SATURATION: 97 % | RESPIRATION RATE: 18 BRPM | BODY MASS INDEX: 23.14 KG/M2 | SYSTOLIC BLOOD PRESSURE: 127 MMHG | DIASTOLIC BLOOD PRESSURE: 79 MMHG | HEART RATE: 97 BPM | TEMPERATURE: 98 F | WEIGHT: 100 LBS

## 2023-03-15 DIAGNOSIS — K52.9 NON-SPECIFIC COLITIS: ICD-10-CM

## 2023-03-15 DIAGNOSIS — R11.2 NAUSEA VOMITING AND DIARRHEA: ICD-10-CM

## 2023-03-15 DIAGNOSIS — R19.7 NAUSEA VOMITING AND DIARRHEA: ICD-10-CM

## 2023-03-15 LAB
ALBUMIN SERPL BCG-MCNC: 4.3 G/DL (ref 3.5–5.2)
ALP SERPL-CCNC: 137 U/L (ref 40–129)
ALT SERPL W P-5'-P-CCNC: 53 U/L (ref 10–50)
ANION GAP SERPL CALCULATED.3IONS-SCNC: 11 MMOL/L (ref 7–15)
AST SERPL W P-5'-P-CCNC: 30 U/L (ref 10–50)
BASOPHILS # BLD AUTO: 0 10E3/UL (ref 0–0.2)
BASOPHILS NFR BLD AUTO: 0 %
BILIRUB SERPL-MCNC: 1.5 MG/DL
BUN SERPL-MCNC: 20.4 MG/DL (ref 8–23)
CALCIUM SERPL-MCNC: 8.9 MG/DL (ref 8.8–10.2)
CHLORIDE SERPL-SCNC: 105 MMOL/L (ref 98–107)
CREAT SERPL-MCNC: 0.92 MG/DL (ref 0.67–1.17)
DEPRECATED HCO3 PLAS-SCNC: 23 MMOL/L (ref 22–29)
EOSINOPHIL # BLD AUTO: 0 10E3/UL (ref 0–0.7)
EOSINOPHIL NFR BLD AUTO: 0 %
ERYTHROCYTE [DISTWIDTH] IN BLOOD BY AUTOMATED COUNT: 12.8 % (ref 10–15)
GFR SERPL CREATININE-BSD FRML MDRD: >90 ML/MIN/1.73M2
GLUCOSE SERPL-MCNC: 115 MG/DL (ref 70–99)
HCT VFR BLD AUTO: 50.4 % (ref 40–53)
HGB BLD-MCNC: 17.8 G/DL (ref 13.3–17.7)
IMM GRANULOCYTES # BLD: 0.1 10E3/UL
IMM GRANULOCYTES NFR BLD: 0 %
INR PPP: 0.94 (ref 0.85–1.15)
LIPASE SERPL-CCNC: 49 U/L (ref 13–60)
LYMPHOCYTES # BLD AUTO: 0.4 10E3/UL (ref 0.8–5.3)
LYMPHOCYTES NFR BLD AUTO: 3 %
MCH RBC QN AUTO: 30.3 PG (ref 26.5–33)
MCHC RBC AUTO-ENTMCNC: 35.3 G/DL (ref 31.5–36.5)
MCV RBC AUTO: 86 FL (ref 78–100)
MONOCYTES # BLD AUTO: 0.6 10E3/UL (ref 0–1.3)
MONOCYTES NFR BLD AUTO: 5 %
NEUTROPHILS # BLD AUTO: 12.5 10E3/UL (ref 1.6–8.3)
NEUTROPHILS NFR BLD AUTO: 92 %
NRBC # BLD AUTO: 0 10E3/UL
NRBC BLD AUTO-RTO: 0 /100
PLATELET # BLD AUTO: 155 10E3/UL (ref 150–450)
POTASSIUM SERPL-SCNC: 3.9 MMOL/L (ref 3.4–5.3)
PROT SERPL-MCNC: 7.3 G/DL (ref 6.4–8.3)
RBC # BLD AUTO: 5.87 10E6/UL (ref 4.4–5.9)
SODIUM SERPL-SCNC: 139 MMOL/L (ref 136–145)
TROPONIN T SERPL HS-MCNC: <6 NG/L
WBC # BLD AUTO: 13.6 10E3/UL (ref 4–11)

## 2023-03-15 PROCEDURE — 74177 CT ABD & PELVIS W/CONTRAST: CPT

## 2023-03-15 PROCEDURE — 36415 COLL VENOUS BLD VENIPUNCTURE: CPT | Performed by: EMERGENCY MEDICINE

## 2023-03-15 PROCEDURE — 85004 AUTOMATED DIFF WBC COUNT: CPT | Performed by: EMERGENCY MEDICINE

## 2023-03-15 PROCEDURE — 93005 ELECTROCARDIOGRAM TRACING: CPT | Performed by: EMERGENCY MEDICINE

## 2023-03-15 PROCEDURE — 84484 ASSAY OF TROPONIN QUANT: CPT | Performed by: EMERGENCY MEDICINE

## 2023-03-15 PROCEDURE — 99285 EMERGENCY DEPT VISIT HI MDM: CPT | Mod: 25

## 2023-03-15 PROCEDURE — 83690 ASSAY OF LIPASE: CPT | Performed by: EMERGENCY MEDICINE

## 2023-03-15 PROCEDURE — 85610 PROTHROMBIN TIME: CPT | Performed by: EMERGENCY MEDICINE

## 2023-03-15 PROCEDURE — 250N000009 HC RX 250: Performed by: EMERGENCY MEDICINE

## 2023-03-15 PROCEDURE — 93005 ELECTROCARDIOGRAM TRACING: CPT | Performed by: STUDENT IN AN ORGANIZED HEALTH CARE EDUCATION/TRAINING PROGRAM

## 2023-03-15 PROCEDURE — 250N000011 HC RX IP 250 OP 636: Performed by: EMERGENCY MEDICINE

## 2023-03-15 PROCEDURE — 96361 HYDRATE IV INFUSION ADD-ON: CPT

## 2023-03-15 PROCEDURE — 250N000013 HC RX MED GY IP 250 OP 250 PS 637: Performed by: EMERGENCY MEDICINE

## 2023-03-15 PROCEDURE — 96374 THER/PROPH/DIAG INJ IV PUSH: CPT | Mod: 59

## 2023-03-15 PROCEDURE — 80053 COMPREHEN METABOLIC PANEL: CPT | Performed by: EMERGENCY MEDICINE

## 2023-03-15 PROCEDURE — 258N000003 HC RX IP 258 OP 636: Performed by: EMERGENCY MEDICINE

## 2023-03-15 RX ORDER — IOPAMIDOL 755 MG/ML
100 INJECTION, SOLUTION INTRAVASCULAR ONCE
Status: COMPLETED | OUTPATIENT
Start: 2023-03-15 | End: 2023-03-15

## 2023-03-15 RX ORDER — DICYCLOMINE HCL 20 MG
20 TABLET ORAL 2 TIMES DAILY
Qty: 20 TABLET | Refills: 0 | Status: SHIPPED | OUTPATIENT
Start: 2023-03-15 | End: 2023-03-25

## 2023-03-15 RX ORDER — ACETAMINOPHEN 325 MG/1
650 TABLET ORAL ONCE
Status: COMPLETED | OUTPATIENT
Start: 2023-03-15 | End: 2023-03-15

## 2023-03-15 RX ORDER — ACETAMINOPHEN 325 MG/1
650 TABLET ORAL EVERY 6 HOURS PRN
Qty: 30 TABLET | Refills: 0 | Status: SHIPPED | OUTPATIENT
Start: 2023-03-15

## 2023-03-15 RX ORDER — ONDANSETRON 2 MG/ML
4 INJECTION INTRAMUSCULAR; INTRAVENOUS ONCE
Status: COMPLETED | OUTPATIENT
Start: 2023-03-15 | End: 2023-03-15

## 2023-03-15 RX ORDER — ONDANSETRON 4 MG/1
4 TABLET, ORALLY DISINTEGRATING ORAL EVERY 6 HOURS PRN
Qty: 20 TABLET | Refills: 0 | Status: SHIPPED | OUTPATIENT
Start: 2023-03-15

## 2023-03-15 RX ORDER — ONDANSETRON 2 MG/ML
4 INJECTION INTRAMUSCULAR; INTRAVENOUS ONCE
Status: DISCONTINUED | OUTPATIENT
Start: 2023-03-15 | End: 2023-03-15

## 2023-03-15 RX ADMIN — IOPAMIDOL 100 ML: 755 INJECTION, SOLUTION INTRAVENOUS at 15:07

## 2023-03-15 RX ADMIN — ACETAMINOPHEN 650 MG: 325 TABLET ORAL at 15:17

## 2023-03-15 RX ADMIN — LIDOCAINE HYDROCHLORIDE 30 ML: 20 SOLUTION ORAL; TOPICAL at 15:18

## 2023-03-15 RX ADMIN — ONDANSETRON 4 MG: 2 INJECTION INTRAMUSCULAR; INTRAVENOUS at 15:18

## 2023-03-15 RX ADMIN — SODIUM CHLORIDE 1000 ML: 9 INJECTION, SOLUTION INTRAVENOUS at 15:20

## 2023-03-15 ASSESSMENT — ACTIVITIES OF DAILY LIVING (ADL): ADLS_ACUITY_SCORE: 35

## 2023-03-15 NOTE — ED PROVIDER NOTES
EMERGENCY DEPARTMENT ENCOUNTER      NAME: Elliot Saha  AGE: 67 year old male  YOB: 1955  MRN: [id number]  EVALUATION DATE & TIME: 3/15/2023  2:57 PM    PCP: Monika Lou    ED PROVIDER: Marco Antonio Daly M.D.      Chief Complaint   Patient presents with     Abdominal Pain     Chest Pain         FINAL IMPRESSION:  1. Nausea vomiting and diarrhea    2. Non-specific colitis          ED COURSE & MEDICAL DECISION MAKIN year old male presents to the Emergency Department for evaluation of nausea vomiting diarrhea and abdominal pain.  He is medically stable when he arrives to the emergency department.  He indicates pain throughout his entire abdomen but more focused in the left upper quadrant and radiating into his lower chest.  He also reports nausea vomiting and diarrhea which sounds consistent with an infectious gastroenteritis.  He is mildly tender on the left side of his abdomen on exam.  He underwent a lab and imaging evaluation as below.  Regarding his chest discomfort, EKG shows sinus rhythm with no ischemic changes and high-sensitivity troponin is less than 6 ruling out ACS especially given multiple days of symptoms.  He was noted to have an elevated white blood cell count and follow-up CT does show a nonspecific colitis.  I think in the setting seems consistent with a infectious colitis given no history of any kind of inflammatory bowel disease and no severe pain or instability to suggest ischemic colitis.  Patient was resting more comfortably after some IV fluid and analgesics here.  We discussed the results of his CT scan and labs.  We discussed a treatment plan consisting of continued oral hydration, Zofran for nausea, Tylenol and Bentyl for discomfort and the importance of clinic follow-up was reviewed.  We discussed return precautions.  Patient was discharged in good condition.    At the conclusion of the encounter I discussed the results of all of the tests and the disposition.  The questions were answered. The patient or family acknowledged understanding and was agreeable with the care plan.       Medical Decision Making    History:    Supplemental history from: Documented in chart, if applicable    External Record(s) reviewed: Documented in chart, if applicable.    Work Up:    Chart documentation includes differential considered and any EKGs or imaging independently interpreted by provider, where specified.    In additional to work up documented, I considered the following work up: Documented in chart, if applicable.    External consultation:    Discussion of management with another provider: Documented in chart, if applicable    Complicating factors:    Care impacted by chronic illness: Hypertension    Care affected by social determinants of health: N/A    Disposition considerations: Discharge. I prescribed additional prescription strength medication(s) as charted. See documentation for any additional details.            MEDICATIONS GIVEN IN THE EMERGENCY:  Medications   0.9% sodium chloride BOLUS (0 mLs Intravenous Stopped 3/15/23 1620)   ondansetron (ZOFRAN) injection 4 mg (4 mg Intravenous $Given 3/15/23 1518)   lidocaine (viscous) (XYLOCAINE) 2 % 15 mL, alum & mag hydroxide-simethicone (MAALOX) 15 mL GI Cocktail (30 mLs Oral $Given 3/15/23 1518)   acetaminophen (TYLENOL) tablet 650 mg (650 mg Oral $Given 3/15/23 1517)   iopamidol (ISOVUE-370) solution 100 mL (100 mLs Intravenous $Given 3/15/23 1507)       NEW PRESCRIPTIONS STARTED AT TODAY'S ER VISIT  Discharge Medication List as of 3/15/2023  4:44 PM      START taking these medications    Details   acetaminophen (TYLENOL) 325 MG tablet Take 2 tablets (650 mg) by mouth every 6 hours as needed for mild pain, Disp-30 tablet, R-0, Local Print      dicyclomine (BENTYL) 20 MG tablet Take 1 tablet (20 mg) by mouth 2 times daily for 10 days, Disp-20 tablet, R-0, Local Print      ondansetron (ZOFRAN ODT) 4 MG ODT tab Take 1 tablet (4 mg) by  mouth every 6 hours as needed for nausea or vomiting, Disp-20 tablet, R-0, Local Print                =================================================================    HPI    Patient information was obtained from: Patient    Use of : N/A         Elliot Saha is a 67 year old male with a pertinent history of HTN who presents to this ED by walk in for evaluation of abdominal pain and chest pain.    The patient presents for evaluation of upper abdominal pain and chest pain with radiation toward his left arm arm. He reports the pain began at 0500 this morning. He is also nauseous, and has had diarrhea.       REVIEW OF SYSTEMS   All systems reviewed and negative except as noted in HPI.    PAST MEDICAL HISTORY:  No past medical history on file.    PAST SURGICAL HISTORY:  Past Surgical History:   Procedure Laterality Date     NO PAST SURGERIES             CURRENT MEDICATIONS:    No current facility-administered medications for this encounter.     Current Outpatient Medications   Medication     acetaminophen (TYLENOL) 325 MG tablet     dicyclomine (BENTYL) 20 MG tablet     ondansetron (ZOFRAN ODT) 4 MG ODT tab     atorvastatin (LIPITOR) 40 MG tablet     famotidine (PEPCID) 40 MG tablet     gabapentin (NEURONTIN) 100 MG capsule     losartan (COZAAR) 25 MG tablet     omeprazole (PRILOSEC) 20 MG DR capsule     simethicone (MYLICON) 80 MG chewable tablet     sucralfate (CARAFATE) 1 GM tablet     valACYclovir (VALTREX) 1000 mg tablet         ALLERGIES:  No Known Allergies    FAMILY HISTORY:  No family history on file.    SOCIAL HISTORY:   Social History     Socioeconomic History     Marital status:    Tobacco Use     Smoking status: Never     Smokeless tobacco: Never   Vaping Use     Vaping Use: Never used   Substance and Sexual Activity     Alcohol use: No     Drug use: No       VITALS:  /79   Pulse 97   Temp 98  F (36.7  C) (Oral)   Resp 18   Wt 45.4 kg (100 lb)   SpO2 97%   BMI 23.14 kg/m       PHYSICAL EXAM    Constitutional: Well developed, Well nourished, NAD.  HENT: Normocephalic, Atraumatic. Neck Supple.  Eyes: EOMI, Conjunctiva normal.  Respiratory: Breathing comfortably on room air. Speaks full sentences easily. Lungs clear to ascultation.  Cardiovascular: Normal heart rate, Regular rhythm. No peripheral edema.  Abdomen: Soft, minimal left upper quadrant tenderness to deep palpation.  No guarding or peritoneal signs  Musculoskeletal: Good range of motion in all major joints. No major deformities noted.  Integument: Warm, Dry.  Neurologic: Alert & awake, Normal motor function, Normal sensory function, No focal deficits noted.   Psychiatric: Cooperative. Affect appropriate.     LAB:  All pertinent labs reviewed and interpreted.  Labs Ordered and Resulted from Time of ED Arrival to Time of ED Departure   COMPREHENSIVE METABOLIC PANEL - Abnormal       Result Value    Sodium 139      Potassium 3.9      Chloride 105      Carbon Dioxide (CO2) 23      Anion Gap 11      Urea Nitrogen 20.4      Creatinine 0.92      Calcium 8.9      Glucose 115 (*)     Alkaline Phosphatase 137 (*)     AST 30      ALT 53 (*)     Protein Total 7.3      Albumin 4.3      Bilirubin Total 1.5 (*)     GFR Estimate >90     CBC WITH PLATELETS AND DIFFERENTIAL - Abnormal    WBC Count 13.6 (*)     RBC Count 5.87      Hemoglobin 17.8 (*)     Hematocrit 50.4      MCV 86      MCH 30.3      MCHC 35.3      RDW 12.8      Platelet Count 155      % Neutrophils 92      % Lymphocytes 3      % Monocytes 5      % Eosinophils 0      % Basophils 0      % Immature Granulocytes 0      NRBCs per 100 WBC 0      Absolute Neutrophils 12.5 (*)     Absolute Lymphocytes 0.4 (*)     Absolute Monocytes 0.6      Absolute Eosinophils 0.0      Absolute Basophils 0.0      Absolute Immature Granulocytes 0.1      Absolute NRBCs 0.0     INR - Normal    INR 0.94     LIPASE - Normal    Lipase 49     TROPONIN T, HIGH SENSITIVITY - Normal    Troponin T, High  Sensitivity <6         RADIOLOGY:  Reviewed all pertinent imaging. Please see official radiology report.  CT Abdomen Pelvis w Contrast   Final Result   IMPRESSION:    1.  Mucosal hyperenhancement and mild mural thickening of the colon and rectum with liquid stool content suggestive of nonspecific acute colitis.          EKG:    Performed at: 1239    Impression: Sinus rhythm, normal ECG    Rate: 83  Rhythm: Sinus  Axis: Normal  FL Interval: 142  QRS Interval: 84  QTc Interval: 411  ST Changes: None  Comparison: No significant changes from January 24, 2018    I have independently reviewed and interpreted the EKG(s) documented above.      I, Sina Coleman, am serving as a scribe to document services personally performed by Dr. Marco Antonio Daly, based on my observation and the provider's statements to me. I, Marco Antonio Daly MD attest that Sina Coleman is acting in a scribe capacity, has observed my performance of the services and has documented them in accordance with my direction.    Marco Antonio Daly M.D.  Emergency Medicine  [facility] EMERGENCY DEPARTMENT  [address] [place] [id number] [phone]  Dept: [phone]       Marco Antonio Daly MD  03/15/23 9007

## 2023-03-15 NOTE — ED TRIAGE NOTES
The pt presents for evaluation of upper abdominal pain and CP with radiation toward his L arm. He reports the pain began at 0500 this morning. He is also nauseous, and has had diarrhea.

## 2023-03-15 NOTE — DISCHARGE INSTRUCTIONS
You were seen in the emergency department for nausea vomiting diarrhea and abdominal pain.  Your evaluation included labs and a CT scan.  This showed evidence of colitis.  This is usually caused by a gastrointestinal infection, typically viral, which we would expect will improve within the next several days.  Please try to drink plenty of liquids to stay well-hydrated.  We are going to prescribe you Zofran you can use for nausea.  You can use Tylenol 650 mg every 6 hours twice a day to help with and Bentyl 20 mg abdominal pain.  Please stick to a very bland diet.  We would recommend contacting clinic to set up follow-up within the next 1 to 2 weeks particularly if symptoms or not improving as expected.  If you have any other immediate concerns particularly intractable vomiting, high fever and weakness, severe increase in abdominal discomfort, we can reevaluate you as needed in the emergency department.

## 2023-03-16 ENCOUNTER — PATIENT OUTREACH (OUTPATIENT)
Dept: CARE COORDINATION | Facility: CLINIC | Age: 68
End: 2023-03-16
Payer: COMMERCIAL

## 2023-03-16 NOTE — PROGRESS NOTES
Clinic Care Coordination Contact    Follow Up Progress Note      Assessment: The pt was recently in the ED, I called to check up on the pt and help the pt setup a ED follow up. The pt was at [facility] for for abdominal pain, and chest pain. I called and talked to the pt, pt stated that he is doing better. Pt stated that he wanted a follow up, so I was able to setup for the pt to come in on 03/21/2023 at 9:40am with .    Care Gaps:    Health Maintenance Due   Topic Date Due     COLORECTAL CANCER SCREENING  Never done     ZOSTER IMMUNIZATION (1 of 2) Never done     Pneumococcal Vaccine: 65+ Years (2 - PCV) 02/16/2019     COVID-19 Vaccine (4 - Booster for Moderna series) 02/14/2022     INFLUENZA VACCINE (1) 09/01/2022     PHQ-2 (once per calendar year)  01/01/2023           Care Plans      Intervention/Education provided during outreach:               Plan:     Care Coordinator will follow up in

## 2023-03-21 ENCOUNTER — OFFICE VISIT (OUTPATIENT)
Dept: FAMILY MEDICINE | Facility: CLINIC | Age: 68
End: 2023-03-21
Payer: COMMERCIAL

## 2023-03-21 VITALS
WEIGHT: 107.8 LBS | TEMPERATURE: 98.1 F | HEART RATE: 79 BPM | HEIGHT: 60 IN | BODY MASS INDEX: 21.17 KG/M2 | OXYGEN SATURATION: 96 % | SYSTOLIC BLOOD PRESSURE: 128 MMHG | DIASTOLIC BLOOD PRESSURE: 86 MMHG

## 2023-03-21 DIAGNOSIS — K52.9 NONSPECIFIC COLITIS: Primary | ICD-10-CM

## 2023-03-21 PROCEDURE — 99213 OFFICE O/P EST LOW 20 MIN: CPT | Mod: GC

## 2023-03-21 ASSESSMENT — PATIENT HEALTH QUESTIONNAIRE - PHQ9: SUM OF ALL RESPONSES TO PHQ QUESTIONS 1-9: 9

## 2023-03-21 NOTE — PROGRESS NOTES
"  Assessment & Plan   67-year-old with history significant for hypertension, hyperlipidemia, GERD.  He presents for ED follow-up.     Nonspecific colitis  Recent ED visit for nausea vomiting diarrhea.  CT suggesting nonspecific acute colitis.  With resolution of symptoms, likely viral in nature.  Exam unremarkable today.  Does have upcoming colonoscopy, which will be useful to rule out other etiologies of colitis.  Follow-up precautions provided.  -Follow-up as needed        No follow-ups on file.    Alireza Mcmillan MD  [facility] Zarate is a 67 year old, presenting for the following health issues:  Hospital F/U (stomach)      HPI     Patient presented to the ED (3/15) with nausea vomiting diarrhea and abdominal pain.  Lab work-up largely unremarkable.  CT abdomen suggesting nonspecific acute colitis.  Symptoms improved after hydration and analgesia in the ED. Wife had similar symptoms prior to Elliot getting sick.     He was prescribed Tylenol, Bentyl, and Zofran at discharge from the ED.    Since ED visit, doing much better.  Feels back to normal as of Sunday.  Eating and drinking okay.  No nausea/vomiting, or diarrhea.  Still taking Tylenol and Bentyl.  Stool is nonbloody.  Does have occasional epigastric pain that was present prior to nausea vomiting diarrhea.  Has been doing well with famotidine.  No fever no chills.      PHQ-9 score - 9 (this was due to being sick) would be 0 today.     Review of Systems   Constitutional, HEENT, cardiovascular, pulmonary, gi and gu systems are negative, except as otherwise noted.      Objective    /86   Pulse 79   Temp 98.1  F (36.7  C) (Tympanic)   Ht 1.385 m (4' 6.53\")   Wt 48.9 kg (107 lb 12.8 oz)   SpO2 96%   BMI 25.49 kg/m    Body mass index is 25.49 kg/m .  Physical Exam   GENERAL: healthy, alert and no distress  NECK: no adenopathy, no asymmetry, masses, or scars and thyroid normal to palpation  RESP: lungs clear to " auscultation - no rales, rhonchi or wheezes  CV: regular rate and rhythm, normal S1 S2, no S3 or S4, no murmur  ABDOMEN: soft, nondistended.  Bowel sounds present.  Mild epigastric tenderness.  No rebound or guarding.  MS: no gross musculoskeletal defects noted, no edema

## 2023-03-21 NOTE — PROGRESS NOTES
Preceptor Attestation:   Patient seen, evaluated and discussed with the resident. I have verified the content of the note, which accurately reflects my assessment of the patient and the plan of care.  Supervising Physician:Chantal Briseon MD  [facility]

## 2023-05-30 DIAGNOSIS — E78.2 MIXED HYPERLIPIDEMIA: ICD-10-CM

## 2023-05-31 RX ORDER — ATORVASTATIN CALCIUM 40 MG/1
40 TABLET, FILM COATED ORAL DAILY
Qty: 90 TABLET | Refills: 1 | Status: SHIPPED | OUTPATIENT
Start: 2023-05-31 | End: 2024-01-29

## 2023-07-06 ENCOUNTER — TRANSFERRED RECORDS (OUTPATIENT)
Dept: HEALTH INFORMATION MANAGEMENT | Facility: CLINIC | Age: 68
End: 2023-07-06
Payer: COMMERCIAL

## 2023-09-08 DIAGNOSIS — R10.13 ABDOMINAL PAIN, EPIGASTRIC: ICD-10-CM

## 2023-09-08 RX ORDER — FAMOTIDINE 40 MG/1
40 TABLET, FILM COATED ORAL DAILY
Qty: 90 TABLET | Refills: 0 | Status: SHIPPED | OUTPATIENT
Start: 2023-09-08 | End: 2023-09-14

## 2023-09-14 DIAGNOSIS — R10.13 ABDOMINAL PAIN, EPIGASTRIC: ICD-10-CM

## 2023-09-14 RX ORDER — FAMOTIDINE 20 MG/1
20 TABLET, FILM COATED ORAL DAILY
Qty: 90 TABLET | Refills: 1 | Status: SHIPPED | OUTPATIENT
Start: 2023-09-14

## 2023-11-13 ENCOUNTER — PATIENT OUTREACH (OUTPATIENT)
Dept: GASTROENTEROLOGY | Facility: CLINIC | Age: 68
End: 2023-11-13
Payer: COMMERCIAL

## 2023-12-04 DIAGNOSIS — R10.13 ABDOMINAL PAIN, EPIGASTRIC: ICD-10-CM

## 2023-12-04 NOTE — TELEPHONE ENCOUNTER
"Message to physician: Rx refill    Date of last visit: 3/21/2023    Date of next visit if scheduled: none    Potassium   Date Value Ref Range Status   03/15/2023 3.9 3.4 - 5.3 mmol/L Final   01/26/2018 3.5 3.5 - 5.0 mmol/L Final     Creatinine   Date Value Ref Range Status   03/15/2023 0.92 0.67 - 1.17 mg/dL Final     GFR Estimate   Date Value Ref Range Status   03/15/2023 >90 >60 mL/min/1.73m2 Final     Comment:     eGFR calculated using 2021 CKD-EPI equation.   01/26/2018 >60 >60 mL/min/1.73m2 Final       BP Readings from Last 3 Encounters:   03/21/23 128/86   03/15/23 127/79   12/16/22 132/89       No results found for: \"A1C\"    Please complete refill and CLOSE ENCOUNTER.  Closing the encounter signifies the refill is complete.    "

## 2023-12-07 DIAGNOSIS — I10 ESSENTIAL HYPERTENSION: ICD-10-CM

## 2023-12-07 RX ORDER — SIMETHICONE 80 MG
80 TABLET,CHEWABLE ORAL EVERY 6 HOURS PRN
Qty: 90 TABLET | Refills: 0 | Status: SHIPPED | OUTPATIENT
Start: 2023-12-07

## 2023-12-07 NOTE — TELEPHONE ENCOUNTER
[facility] phone call message- medication clarification/question:    Full Medication Name: losartan (COZAAR) 25 MG tablet     Question: Patient requesting for refill on medication listed above.      Pharmacy confirmed as [facility] [id number] [facility] - [place], [place] - [address]: Yes    OK to leave a message on voice mail? Yes    Primary language: English      needed? No    Call taken on December 7, 2023 at 12:24 PM by Megan Armenta

## 2023-12-08 RX ORDER — LOSARTAN POTASSIUM 25 MG/1
25 TABLET ORAL DAILY
Qty: 90 TABLET | Refills: 3 | Status: SHIPPED | OUTPATIENT
Start: 2023-12-08

## 2024-01-04 ENCOUNTER — OFFICE VISIT (OUTPATIENT)
Dept: FAMILY MEDICINE | Facility: CLINIC | Age: 69
End: 2024-01-04
Payer: COMMERCIAL

## 2024-01-04 VITALS
HEART RATE: 64 BPM | DIASTOLIC BLOOD PRESSURE: 84 MMHG | SYSTOLIC BLOOD PRESSURE: 131 MMHG | TEMPERATURE: 97.7 F | OXYGEN SATURATION: 100 %

## 2024-01-04 DIAGNOSIS — J06.9 VIRAL URI WITH COUGH: Primary | ICD-10-CM

## 2024-01-04 LAB
FLUAV RNA SPEC QL NAA+PROBE: NEGATIVE
FLUBV RNA RESP QL NAA+PROBE: NEGATIVE
RSV RNA SPEC NAA+PROBE: NEGATIVE
SARS-COV-2 RNA RESP QL NAA+PROBE: NEGATIVE

## 2024-01-04 PROCEDURE — 87637 SARSCOV2&INF A&B&RSV AMP PRB: CPT | Performed by: STUDENT IN AN ORGANIZED HEALTH CARE EDUCATION/TRAINING PROGRAM

## 2024-01-04 PROCEDURE — 99213 OFFICE O/P EST LOW 20 MIN: CPT | Performed by: STUDENT IN AN ORGANIZED HEALTH CARE EDUCATION/TRAINING PROGRAM

## 2024-01-04 RX ORDER — BENZONATATE 200 MG/1
200 CAPSULE ORAL 3 TIMES DAILY PRN
Qty: 60 CAPSULE | Refills: 0 | Status: SHIPPED | OUTPATIENT
Start: 2024-01-04

## 2024-01-04 NOTE — PROGRESS NOTES
Assessment & Plan     Viral URI with cough  Predominanetly cough with component of postnasal drip. No fevers or shortness of breath. Discussed symptomatic treatment. Cough preventing sleep so will add tesslon pearls in addition to dextromethorphan.   - benzonatate (TESSALON) 200 MG capsule; Take 1 capsule (200 mg) by mouth 3 times daily as needed for cough  - Symptomatic Influenza A/B, RSV, & SARS-CoV2 PCR (COVID-19) Nose; Future  - Symptomatic Influenza A/B, RSV, & SARS-CoV2 PCR (COVID-19) Nose  - warning signs reviewed        Irma Yo MD  [facility] Subjective Yia is a 68 year old, presenting for the following health issues:  Cough (Since last Thursday, tested for COVID on Tuesday was negative. OTC medications not helping /Cough waking pt up in middle of night and causing chest pain, no fevers or chills. Pt states feels mucus in throat but can't get out.)      1/4/2024    10:20 AM   Additional Questions   Roomed by Ety   Accompanied by Self       HPI     Has had a cough for the past week. A little bit of a runny nose. Will have some chest congestion when he is coughing. Feels like there might be some phelgm but hard to get out. No headaches. No shortness of breath. He hasn't had a sore throat. The cough is worse at night.     He has tried delsym which didn't help. Has also taken tylenol.     He has been around others who were sick during the holidays. Two of his grandchildren were sick.     He did take a COVID test at home on Tuesday which was negative.       Review of Systems   Constitutional, HEENT, cardiovascular, pulmonary, gi and gu systems are negative, except as otherwise noted.      Objective    /84 (BP Location: Right arm, Patient Position: Sitting, Cuff Size: Adult Regular)   Pulse 64   Temp 97.7  F (36.5  C) (Oral)   SpO2 100%   There is no height or weight on file to calculate BMI.  Physical Exam   GENERAL: healthy, alert and no distress  HEENT: mild  rhinorrhea, posterior pharynx cobblestoning, no tonsillar exudate or swelling  NECK: no adenopathy, no asymmetry, masses, or scars and thyroid normal to palpation  RESP: lungs clear to auscultation - no rales, rhonchi or wheezes  CV: regular rate and rhythm, normal S1 S2, no S3 or S4, no murmur, click or rub, no peripheral edema and peripheral pulses strong  MS: no gross musculoskeletal defects noted, no edema

## 2024-01-29 DIAGNOSIS — E78.2 MIXED HYPERLIPIDEMIA: ICD-10-CM

## 2024-01-29 RX ORDER — ATORVASTATIN CALCIUM 40 MG/1
40 TABLET, FILM COATED ORAL DAILY
Qty: 90 TABLET | Refills: 3 | Status: SHIPPED | OUTPATIENT
Start: 2024-01-29

## 2024-02-04 ENCOUNTER — HEALTH MAINTENANCE LETTER (OUTPATIENT)
Age: 69
End: 2024-02-04

## 2024-02-14 ENCOUNTER — OFFICE VISIT (OUTPATIENT)
Dept: FAMILY MEDICINE | Facility: CLINIC | Age: 69
End: 2024-02-14
Payer: COMMERCIAL

## 2024-02-14 VITALS
HEART RATE: 79 BPM | DIASTOLIC BLOOD PRESSURE: 85 MMHG | SYSTOLIC BLOOD PRESSURE: 127 MMHG | OXYGEN SATURATION: 95 % | TEMPERATURE: 98.3 F

## 2024-02-14 DIAGNOSIS — T50.B95A: ICD-10-CM

## 2024-02-14 DIAGNOSIS — J06.9 VIRAL URI WITH COUGH: Primary | ICD-10-CM

## 2024-02-14 PROCEDURE — 99213 OFFICE O/P EST LOW 20 MIN: CPT | Mod: GC

## 2024-02-14 PROCEDURE — 87637 SARSCOV2&INF A&B&RSV AMP PRB: CPT

## 2024-02-14 NOTE — PROGRESS NOTES
"  Assessment & Plan     Viral URI with cough  Onset yesterday, do not suspect cough and itchy throat 2/2 to below issue. Vitals and physical exam reassuring. Triple viral swab negative here today. Counseled on supportive cares, including trying honey for his cough. Discussed red flag sxs that would warrant quick return. Will return in about a month when he is recovered for routine annual wellness visit.   - Symptomatic Influenza A/B, RSV, & SARS-CoV2 PCR (COVID-19) Nose    Adverse effect of zoster vaccine  Suspect patient's initial symptoms 2/2 his second shingles vaccine. Affirms chills, sweating and muscle aches. Has been taking tylenol. Overall, sxs improving. Provided reassurance.       Return in about 1 month (around 3/14/2024) for Routine preventive.    Stefan Zarate is a 68 year old, presenting for the following health issues:  Fever (Patient doesn't have thermometer at home but has had chills and body sweats at home. And body aches. For 4 days) and Cough (1 day. Patient tested for covid at home and had a negative covid test.)        2/14/2024    10:13 AM   Additional Questions   Roomed by Dorothea FERNANDES   Received 2nd shingles shot on Friday at [facility]. That night started with chills, sweating and body aches which he has had ever since. Symptoms overall better today. Has taken tylenol, has helped. No side effects with first dose.   Dry cough started yesterday. Took COVID test yesterday, was negative.   Denies congestion/rhinorrhea, no sore throat but \"a little itchy.\" No N/V/D, SOB or wheezing.   Has been difficult to sleep. Is eating less now with cough.   Drinking hot water to soothe throat.   No known sick contacts. 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       Review of Systems  Constitutional, HEENT, cardiovascular, pulmonary, gi and gu systems are negative, except as otherwise noted.      Objective    /85   Pulse 79   Temp 98.3  F (36.8  C)   SpO2 95%   There is " no height or weight on file to calculate BMI.  Physical Exam   GENERAL: alert and no distress, intermittently coughing  EYES: eyes grossly normal to inspection  RESP: lungs clear to auscultation - no rales, rhonchi or wheezes  CV: regular rate and rhythm, normal S1 S2, no murmurs  SKIN: no suspicious lesions or rashes visualized  PSYCH: mentation appears normal, affect normal/bright    Results for orders placed or performed in visit on 02/14/24   Symptomatic Influenza A/B, RSV, & SARS-CoV2 PCR (COVID-19) Nose     Status: Normal    Specimen: Nose; Swab   Result Value Ref Range    Influenza A PCR Negative Negative    Influenza B PCR Negative Negative    RSV PCR Negative Negative    SARS CoV2 PCR Negative Negative    Narrative    Testing was performed using the Xpert Xpress CoV2/Flu/RSV Assay on the Cepheid GeneXpert Instrument. This test should be ordered for the detection of SARS-CoV-2, influenza, and RSV viruses in individuals who meet clinical and/or epidemiological criteria. Test performance is unknown in asymptomatic patients. This test is for in vitro diagnostic use under the FDA EUA for laboratories certified under CLIA to perform high or moderate complexity testing. This test has not been FDA cleared or approved. A negative result does not rule out the presence of PCR inhibitors in the specimen or target RNA in concentration below the limit of detection for the assay. If only one viral target is positive but coinfection with multiple targets is suspected, the sample should be re-tested with another FDA cleared, approved, or authorized test, if coinfection would change clinical management. This test was validated by the [facility]. These laboratories are certified under the Clinical Laboratory Improvement Amendments of 1988 (CLIA-88) as qualified to perform high complexity laboratory testing.           Signed Electronically by: Haresh George MD

## 2024-02-14 NOTE — PATIENT INSTRUCTIONS
Nice to meet you today!   I'm sorry you had bad effects from the shingles vaccine.   Try honey for your cough. Otherwise, keep pushing fluids and rest!   See you back in one month for annual wellness visit.

## 2024-02-18 NOTE — PROGRESS NOTES
Preceptor Attestation:  Patient's case reviewed and discussed with Haresh George MD resident and I evaluated the patient. I agree with written assessment and plan of care.  Supervising Physician:  Dex Mahmood MD, MD [facility]

## 2025-01-30 PROBLEM — J47.9 BRONCHIECTASIS WITHOUT COMPLICATION (H): Status: ACTIVE | Noted: 2019-12-10

## 2025-02-21 ENCOUNTER — OFFICE VISIT (OUTPATIENT)
Dept: FAMILY MEDICINE | Facility: CLINIC | Age: 70
End: 2025-02-21
Payer: COMMERCIAL

## 2025-02-21 VITALS
BODY MASS INDEX: 22.97 KG/M2 | HEIGHT: 60 IN | SYSTOLIC BLOOD PRESSURE: 128 MMHG | OXYGEN SATURATION: 97 % | DIASTOLIC BLOOD PRESSURE: 78 MMHG | WEIGHT: 117 LBS | RESPIRATION RATE: 22 BRPM | HEART RATE: 80 BPM | TEMPERATURE: 97.9 F

## 2025-02-21 DIAGNOSIS — R73.09 ELEVATED GLUCOSE: Primary | ICD-10-CM

## 2025-02-21 DIAGNOSIS — I10 ESSENTIAL HYPERTENSION: ICD-10-CM

## 2025-02-21 DIAGNOSIS — R73.9 HYPERGLYCEMIA: ICD-10-CM

## 2025-02-21 DIAGNOSIS — E78.2 MIXED HYPERLIPIDEMIA: ICD-10-CM

## 2025-02-21 LAB
EST. AVERAGE GLUCOSE BLD GHB EST-MCNC: 114 MG/DL
HBA1C MFR BLD: 5.6 % (ref 0–5.6)

## 2025-02-21 NOTE — PROGRESS NOTES
Preceptor Attestation:   Patient seen, evaluated and discussed with the resident Dr. Jaun Coffman. I have verified the content of the note, which accurately reflects my assessment of the patient and the plan of care.    Supervising Physician:  Benjamin Rosenstein, MD, MA  [facility]  [facility]

## 2025-02-21 NOTE — PROGRESS NOTES
"  Assessment & Plan     (R73.09) Elevated glucose  (primary encounter diagnosis)  Comment: Elevated glucose on recent lab work.  No personal history of diabetes.  Will check A1c.  Plan:    -A1c today    (I10) Essential hypertension  Comment: Blood pressure at goal today 128/78.  Does not take at home.  Is on losartan 25 daily.  Normal BMP a couple weeks ago.  Here for blood pressure follow-up and it is looking good.  No hypertensive symptoms.  Plan:    -Continue with losartan   -Annual BMP and follow-up    (E78.2) Mixed hyperlipidemia  Comment: History of hyperlipidemia on a statin.  LDL at 50 now down from 130 previously.  Congratulated patient but will continue with this medication.  Plan:    -Continue statin   -Annual lipid panel      No follow-ups on file.    Stefan Zarate is a 69 year old, presenting for the following health issues:  RECHECK (BP)    HPI     A1c -     BP Readings from Last 6 Encounters:   02/21/25 128/78   01/30/25 (!) 148/91   02/14/24 127/85   01/04/24 131/84   03/21/23 128/86   03/15/23 127/79     Got RSV          Objective    /78   Pulse 80   Temp 97.9  F (36.6  C)   Resp 22   Ht 1.397 m (4' 7\")   Wt 53.1 kg (117 lb)   SpO2 97%   BMI 27.19 kg/m    Body mass index is 27.19 kg/m .  Physical Exam   GENERAL: Healthy, alert and no distress  EYES: Eyes grossly normal to inspection.  No discharge or erythema, or obvious scleral/conjunctival abnormalities.  RESP:  Lungs clear throughout. No wheeze or crackles.   CV: Heart RRR. No murmur  MSK: No gross deformity. Normal tone.  SKIN: Visible skin clear. No significant rash, abnormal pigmentation or lesions.  NEURO: Cranial nerves grossly intact.  Mentation and speech appropriate for age.  PSYCH: Mentation appears normal, affect normal/bright, judgement and insight intact, normal speech and appearance well-groomed.            Signed Electronically by: Arie Coffman MD    "

## 2025-03-02 ENCOUNTER — HEALTH MAINTENANCE LETTER (OUTPATIENT)
Age: 70
End: 2025-03-02

## 2025-04-05 DIAGNOSIS — E78.2 MIXED HYPERLIPIDEMIA: ICD-10-CM

## 2025-04-07 RX ORDER — ATORVASTATIN CALCIUM 40 MG/1
40 TABLET, FILM COATED ORAL DAILY
Qty: 90 TABLET | Refills: 3 | Status: SHIPPED | OUTPATIENT
Start: 2025-04-07

## 2025-09-03 ENCOUNTER — TELEPHONE (OUTPATIENT)
Dept: FAMILY MEDICINE | Facility: CLINIC | Age: 70
End: 2025-09-03
Payer: COMMERCIAL